# Patient Record
Sex: MALE | Race: WHITE | Employment: OTHER | URBAN - METROPOLITAN AREA
[De-identification: names, ages, dates, MRNs, and addresses within clinical notes are randomized per-mention and may not be internally consistent; named-entity substitution may affect disease eponyms.]

---

## 2019-12-24 PROBLEM — I10 ESSENTIAL HYPERTENSION: Status: ACTIVE | Noted: 2019-12-24

## 2019-12-24 PROBLEM — E78.00 PURE HYPERCHOLESTEROLEMIA: Status: ACTIVE | Noted: 2019-12-24

## 2019-12-24 PROBLEM — I25.10 CORONARY ARTERY DISEASE INVOLVING NATIVE CORONARY ARTERY OF NATIVE HEART WITHOUT ANGINA PECTORIS: Status: ACTIVE | Noted: 2019-12-24

## 2019-12-24 PROBLEM — R00.1 BRADYCARDIA: Status: ACTIVE | Noted: 2019-12-24

## 2020-06-14 RX ORDER — CEFAZOLIN SODIUM/WATER 2 G/20 ML
2 SYRINGE (ML) INTRAVENOUS ONCE
Status: CANCELLED | OUTPATIENT
Start: 2020-06-14 | End: 2020-06-14

## 2020-06-16 ENCOUNTER — HOSPITAL ENCOUNTER (OUTPATIENT)
Dept: SURGERY | Age: 70
Discharge: HOME | End: 2020-06-16
Payer: MEDICARE

## 2020-06-16 ENCOUNTER — HOSPITAL ENCOUNTER (OUTPATIENT)
Dept: PHYSICAL THERAPY | Age: 70
Discharge: HOME | End: 2020-06-16
Payer: MEDICARE

## 2020-06-16 ENCOUNTER — HOME HEALTH ADMISSION (OUTPATIENT)
Dept: HOME HEALTH SERVICES | Facility: HOME HEALTH | Age: 70
End: 2020-06-16
Payer: MEDICARE

## 2020-06-16 VITALS
DIASTOLIC BLOOD PRESSURE: 91 MMHG | TEMPERATURE: 97.7 F | BODY MASS INDEX: 28.04 KG/M2 | SYSTOLIC BLOOD PRESSURE: 146 MMHG | OXYGEN SATURATION: 97 % | HEART RATE: 53 BPM | WEIGHT: 207 LBS | HEIGHT: 72 IN

## 2020-06-16 LAB
ANION GAP SERPL CALC-SCNC: 3 MMOL/L (ref 7–16)
APTT PPP: 29.5 SEC (ref 24.3–35.4)
BACTERIA SPEC CULT: NORMAL
BASOPHILS # BLD: 0 K/UL (ref 0–0.2)
BASOPHILS NFR BLD: 1 % (ref 0–2)
BUN SERPL-MCNC: 15 MG/DL (ref 8–23)
CALCIUM SERPL-MCNC: 9.3 MG/DL (ref 8.3–10.4)
CHLORIDE SERPL-SCNC: 106 MMOL/L (ref 98–107)
CO2 SERPL-SCNC: 28 MMOL/L (ref 21–32)
CREAT SERPL-MCNC: 0.94 MG/DL (ref 0.8–1.5)
DIFFERENTIAL METHOD BLD: ABNORMAL
EOSINOPHIL # BLD: 0.1 K/UL (ref 0–0.8)
EOSINOPHIL NFR BLD: 2 % (ref 0.5–7.8)
ERYTHROCYTE [DISTWIDTH] IN BLOOD BY AUTOMATED COUNT: 13.5 % (ref 11.9–14.6)
EST. AVERAGE GLUCOSE BLD GHB EST-MCNC: 105 MG/DL
GLUCOSE SERPL-MCNC: 104 MG/DL (ref 65–100)
HBA1C MFR BLD: 5.3 %
HCT VFR BLD AUTO: 48.8 % (ref 41.1–50.3)
HGB BLD-MCNC: 16.5 G/DL (ref 13.6–17.2)
IMM GRANULOCYTES # BLD AUTO: 0 K/UL (ref 0–0.5)
IMM GRANULOCYTES NFR BLD AUTO: 0 % (ref 0–5)
INR PPP: 1
LYMPHOCYTES # BLD: 1.4 K/UL (ref 0.5–4.6)
LYMPHOCYTES NFR BLD: 32 % (ref 13–44)
MCH RBC QN AUTO: 32.3 PG (ref 26.1–32.9)
MCHC RBC AUTO-ENTMCNC: 33.8 G/DL (ref 31.4–35)
MCV RBC AUTO: 95.5 FL (ref 79.6–97.8)
MONOCYTES # BLD: 0.5 K/UL (ref 0.1–1.3)
MONOCYTES NFR BLD: 12 % (ref 4–12)
NEUTS SEG # BLD: 2.4 K/UL (ref 1.7–8.2)
NEUTS SEG NFR BLD: 53 % (ref 43–78)
NRBC # BLD: 0 K/UL (ref 0–0.2)
PLATELET # BLD AUTO: 103 K/UL (ref 150–450)
PMV BLD AUTO: 12.4 FL (ref 9.4–12.3)
POTASSIUM SERPL-SCNC: 3.9 MMOL/L (ref 3.5–5.1)
PROTHROMBIN TIME: 13.1 SEC (ref 12–14.7)
RBC # BLD AUTO: 5.11 M/UL (ref 4.23–5.6)
SERVICE CMNT-IMP: NORMAL
SODIUM SERPL-SCNC: 137 MMOL/L (ref 136–145)
WBC # BLD AUTO: 4.5 K/UL (ref 4.3–11.1)

## 2020-06-16 PROCEDURE — 87641 MR-STAPH DNA AMP PROBE: CPT

## 2020-06-16 PROCEDURE — 85730 THROMBOPLASTIN TIME PARTIAL: CPT

## 2020-06-16 PROCEDURE — 77030027138 HC INCENT SPIROMETER -A

## 2020-06-16 PROCEDURE — 36415 COLL VENOUS BLD VENIPUNCTURE: CPT

## 2020-06-16 PROCEDURE — 97161 PT EVAL LOW COMPLEX 20 MIN: CPT

## 2020-06-16 PROCEDURE — 80048 BASIC METABOLIC PNL TOTAL CA: CPT

## 2020-06-16 PROCEDURE — 85025 COMPLETE CBC W/AUTO DIFF WBC: CPT

## 2020-06-16 PROCEDURE — 83036 HEMOGLOBIN GLYCOSYLATED A1C: CPT

## 2020-06-16 PROCEDURE — 85610 PROTHROMBIN TIME: CPT

## 2020-06-16 RX ORDER — LANOLIN ALCOHOL/MO/W.PET/CERES
3 CREAM (GRAM) TOPICAL
COMMUNITY

## 2020-06-16 RX ORDER — MELATONIN
5000 DAILY
COMMUNITY

## 2020-06-16 RX ORDER — MULTIVIT WITH MINERALS/HERBS
1 TABLET ORAL DAILY
COMMUNITY

## 2020-06-16 RX ORDER — SELENIUM 200 MCG
200 TABLET ORAL DAILY
COMMUNITY
End: 2020-07-08

## 2020-06-16 RX ORDER — ASPIRIN 81 MG/1
81 TABLET ORAL DAILY
COMMUNITY
End: 2020-07-08 | Stop reason: SDUPTHER

## 2020-06-16 NOTE — PROGRESS NOTES
Bret Acevedo  : (80 y.o.) Joint Isa Manzo at [address] [address].  [phone]       Physical Therapy Prehab Plan of Treatment and Evaluation Summary:2020    ICD-10: Treatment Diagnosis:   · Pain in Right Hip (M25.551)  · Stiffness of Right Hip, Not elsewhere classified (M25.651)  Precautions/Allergies:   Patient has no known allergies. MEDICAL/REFERRING DIAGNOSIS:  Unilateral primary osteoarthritis, right hip [M16.11]  REFERRING PHYSICIAN: Svetlana Elizabeth MD  DATE OF SURGERY: 20    Assessment:   Comments:  He is here with his spouse. [place] at [place] with her support. He currently has a R LE leg length discrepancy of just over an inch and has built up shoe. This is premorbid and he will need to wear these once ambulating for safety. He is motivated and prepared for surgery      PROBLEM LIST (Impacting functional limitations):  Mr. Margie Toth presents with the following right lower extremity(s) problems:  1. Strength  2. Range of Motion  3. Home Exercise Program  4. Pain   INTERVENTIONS PLANNED:  1. Home Exercise Program  2. Educational Discussion      TREATMENT PLAN: Effective Dates: 2020 TO 2020. Frequency/Duration: Patient to continue to perform home exercise program at least twice per day up until his surgery. GOALS: (Goals have been discussed and agreed upon with patient.)  Discharge Goals: Time Frame: 1 Day  1. Patient will demonstrate independence with a home exercise program designed to increase strength, range of motion and pain control to minimize functional deficits and optimize patient for total joint replacement. Rehabilitation Potential For Stated Goals: Good  Regarding Gilbert Mcpherson's therapy, I certify that the treatment plan above will be carried out by a therapist or under their direction.   Thank you for this referral,  Oxana Benítez, PT               HISTORY:   Present Symptoms:  Pain Intensity 1: 8(at its worst)  Pain Location 1: Hip, Groin  Pain Orientation 1: Anterior, Lateral, Medial, Posterior, Right   History of Present Injury/Illness (Reason for Referral):  Medical/Referring Diagnosis: Unilateral primary osteoarthritis, right hip [M16.11]   Past Medical History/Comorbidities:   Mr. Chalo Kaye  has a past medical history of CAD (coronary artery disease), Essential hypertension, Hyperlipidemia, and Thyroid disease. Mr. Chalo Kaye  has a past surgical history that includes hx heart catheterization and hx coronary stent placement. Social History/Living Environment:   Home Environment: Private residence  # Steps to Enter: 7  Hand Rails : Right  One/Two Story Residence: One story  Support Systems: Spouse/Significant Other/Partner  Patient Expects to be Discharged to[de-identified] Private residence  Current DME Used/Available at Home: None  Tub or Shower Type: Shower  Work/Activity:  retired  Dominant Side:  RIGHT  Current Medications:  See [address] note   Number of Personal Factors/Comorbidities that affect the Plan of Care: 1-2: MODERATE COMPLEXITY   EXAMINATION:   ADLs (Current Functional Status):   Ambulation:  [x] Independent  [] Walk Indoors Only  [] Walk Outdoors  [] Use Assistive Device  [] Use Wheelchair Only Dressing:  [x] Independent  Requires Assistance from Someone for:  [] Sock/Shoes  [] Pants  [] Everything   Bathing/Showering:   [x] Independent  [] Requires Assistance from Someone  [] [address] Only Household Activities:  [x] Routine house and yard work  [] Light Housework Only  [] None   Observation/Orthostatic Postural Assessment:    Leg length discrepancy, right(R LE 1/2\" shorter than L.  Has built up shoe)  ROM/Flexibility:   AROM: Within functional limits(L hip/knee 4; ankle 3+)                       RLE AROM  R Hip Flexion: 100  R Hip ABduction: 20   Strength:   Strength: Generally decreased, functional              RLE Strength  R Hip Flexion: 3+  R Hip ABduction: 3+  R Knee Extension: 4  R Ankle Dorsiflexion: 3+   Functional Mobility:    Sensation: Intact(L LE)    Stand to Sit: Independent  Sit to Stand: Independent  Stand Pivot Transfers: Independent  Distance (ft): 300 Feet (ft)  Ambulation - Level of Assistance: Independent  Assistive Device: (R built up shoe)  Speed/Teresa: Pace decreased (<100 feet/min)  Gait Abnormalities: Antalgic          Balance:    Sitting: Intact  Standing: Intact   Body Structures Involved:  1. Bones  2. Joints  3. Muscles Body Functions Affected:  1. Neuromusculoskeletal  2. Movement Related Activities and Participation Affected:  1. General Tasks and Demands  2. Mobility   Number of elements that affect the Plan of Care: 4+: HIGH COMPLEXITY   CLINICAL PRESENTATION:   Presentation: Stable and uncomplicated: LOW COMPLEXITY   CLINICAL DECISION MAKING:   Outcome Measure: Tool Used: Lower Extremity Functional Scale (LEFS)  Score:  Initial: 30/80 Most Recent: X/80 (Date: -- )   Interpretation of Score: 20 questions each scored on a 5 point scale with 0 representing \"extreme difficulty or unable to perform\" and 4 representing \"no difficulty\". The lower the score, the greater the functional disability. 80/80 represents no disability. Minimal detectable change is 9 points. Medical Necessity:   · Mr. Hanna Moore is expected to optimize his lower extremity strength and ROM in preparation for joint replacement surgery. Reason for Services/Other Comments:  · Achieve baseline assesment of musculoskeletal system, functional mobility and home environment. , educate in PT HEP in preparation for surgery, educate in hospital plan of care. Use of outcome tool(s) and clinical judgement create a POC that gives a: Clear prediction of patient's progress: LOW COMPLEXITY   TREATMENT:   Treatment/Session Assessment:  Patient was instructed in PT- HEP to increase strength and ROM in LEs. Answered all questions.   · Post session pain:  2  · Compliance with Program/Exercises: compliant most of the time.   Total Treatment Duration:  PT Patient Time In/Time Out  Time In: 1045  Time Out: [address] [facility]

## 2020-06-16 NOTE — PROGRESS NOTES
06/16/20 1030   Oxygen Therapy   O2 Sat (%) 97 %   Pulse via Oximetry 56 beats per minute   O2 Device Room air   Pre-Treatment   Breath Sounds Bilateral Clear;Diminished   Pre FEV1 (liters) 2.5 liters   % Predicted 69   Incentive Spirometry Treatment   Actual Volume (ml) 2000 ml     Initial respiratory Assessment completed with pt. Pt was interviewed and evaluated in Joint camp prior to surgery. Patient ID:  Homero Lopez  [id number]  57 y.o.  1950  Surgeon: Dr. Tavo Butler  Date of Surgery: 7/6/2020  Procedure: Total Right Hip Arthroplasty  Primary Care Physician: Kristy Zarate [phone]  Specialists:                      Pt instructed in the use of Incentive Spirometry. Pt instructed to bring Incentive Spirometer back on date of surgery & to start using Is upon return to pt room. Written instructions given to patient.   Pt taught proper cough technique    History of smoking:   DENIES                 Quit date:         Secondhand smoke:PARENTS    Past procedures with Oxygen desaturation or delayed awakening:DENIES    Past Medical History:   Diagnosis Date    Bradycardia     CAD (coronary artery disease)     2 stents placed in 2007 in [place] ; now followed by [address] [place] [address] [address] Cardiology     Essential hypertension     no meds     Hemochromatosis     not followed by Hematology     Hyperlipidemia     no meds     Ill-defined condition     has prosthetic left eye     Lower back pain     Melanoma ([facility] [address] 1986    left eye; eye was removed     OA (osteoarthritis)     Thyroid disease     Hypo=on meds    Vasovagal syncope 2014    while starting an IV for a colonoscopy         Respiratory history:DENIES SOB                                                                  Respiratory meds:  DENIES    FAMILY PRESENT:           WIFE                                                  PAST SLEEP STUDY:                    DENIES  HX OF CARLOS:                                         DENIES  CARLOS assessment: SLEEPS ON SIDE                                                   PHYSICAL EXAM   Body mass index is 28.07 kg/m².    Visit Vitals  BP (!) 146/91 (BP 1 Location: Left arm, BP Patient Position: At rest;Sitting)   Pulse (!) 53   Temp 97.7 °F (36.5 °C)   Ht 6' (1.829 m)   Wt 93.9 kg (207 lb)   SpO2 97%   BMI 28.07 kg/m²     Neck circumference:  41    cm    Loud snoring:                                         SOME  Witnessed apnea or wakening gasping or choking:        DENIES        Awakens with headaches:                                               DENIES  Morning or daytime tiredness/ sleepiness:                            TIRED  Dry mouth or sore throat in morning:            YES                                              Figueroa stage:  2-3                                   SACS score:18  Stop Bang   STOP-BANG  Does the patient snore loudly (louder than talking or loud enough to be heard through closed doors)?: No  Does the patient often feel tired, fatigued, or sleepy during the daytime, even after a \"good\" night's sleep?: Yes  Has anyone ever observed the patient stop breathing during their sleep? : No  Does the patient have or are they being treated for high blood pressure?: Yes  Is the patient's BMI greater than 35?: No  Is your neck circumference greater than 17 inches (Male) or 16 inches (Female)?: No  Is the patient older than 48?: Yes  Is the patient male?: Yes  CARLOS Score: 4  Has the patient been referred to Sleep Medicine?: No  Has the patient previously been diagnosed with Obstructive Sleep Apnea?: No                                        [facility]                             Referrals:    Pt. Phone Number:

## 2020-06-16 NOTE — PERIOP NOTES
PLEASE CONTINUE TAKING ALL PRESCRIPTION MEDICATIONS UP TO THE DAY OF SURGERY UNLESS OTHERWISE DIRECTED BELOW. DISCONTINUE all vitamins and supplements 21 days prior to surgery. DISCONTINUE Non-Steriodal Anti-Inflammatory (NSAIDS) such as Advil, Ibuprofen, Motrin, Naproxen, and Aleve five days prior to surgery. Home Medications to take  the day of surgery (07/06/2020)    81 mg Aspirin, Levothyroxine, Nature-thyroid           Home Medications   to Hold           Comments   *Bring: Gege-hex soap, Incentive Spirometer, Photo ID, Insurance Card, Nature-Thyroid   *Do not take Tylenol for 24 hours prior to surgery. *Visitor policy of 1 visitor per patient discussed. Please do not bring home medications with you on the day of surgery unless otherwise directed by your nurse. If you are instructed to bring home medications, please give them to your nurse as they will be administered by the nursing staff. If you have any questions, please call [facility] [phone]. A copy of this note was provided to the patient for reference.

## 2020-06-16 NOTE — PROGRESS NOTES
[facility] Camp Case Management note:  Patient screened in Prehab for discharge planning needs. Patient scheduled for a future total joint replacement. We discussed Home Health and equipment needed after surgery. List of Home Health providers offered. Patient w/o preference towards provider. Initial referral sent to [facility]. Will need walker and raised toilet seat.

## 2020-06-16 NOTE — PERIOP NOTES
Dr. Uriel Seth,    Your patient was seen in [address] [address] today. I am attaching the results of the labs for your to review and follow-up with if necessary. If you would like to order additional labs or tests please enter into [facility] or fax to [phone]. Please do not respond to this message as my mailbox is not monitored. You may call [phone] with questions or concerns. Recent Results (from the past 12 hour(s))   CBC WITH AUTOMATED DIFF    Collection Time: 06/16/20 10:32 AM   Result Value Ref Range    WBC 4.5 4.3 - 11.1 K/uL    RBC 5.11 4.23 - 5.6 M/uL    HGB 16.5 13.6 - 17.2 g/dL    HCT 48.8 41.1 - 50.3 %    MCV 95.5 79.6 - 97.8 FL    MCH 32.3 26.1 - 32.9 PG    MCHC 33.8 31.4 - 35.0 g/dL    RDW 13.5 11.9 - 14.6 %    PLATELET 193 (L) 403 - 450 K/uL    MPV 12.4 (H) 9.4 - 12.3 FL    ABSOLUTE NRBC 0.00 0.0 - 0.2 K/uL    DF AUTOMATED      NEUTROPHILS 53 43 - 78 %    LYMPHOCYTES 32 13 - 44 %    MONOCYTES 12 4.0 - 12.0 %    EOSINOPHILS 2 0.5 - 7.8 %    BASOPHILS 1 0.0 - 2.0 %    IMMATURE GRANULOCYTES 0 0.0 - 5.0 %    ABS. NEUTROPHILS 2.4 1.7 - 8.2 K/UL    ABS. LYMPHOCYTES 1.4 0.5 - 4.6 K/UL    ABS. MONOCYTES 0.5 0.1 - 1.3 K/UL    ABS. EOSINOPHILS 0.1 0.0 - 0.8 K/UL    ABS. BASOPHILS 0.0 0.0 - 0.2 K/UL    ABS. IMM.  GRANS. 0.0 0.0 - 0.5 K/UL   PROTHROMBIN TIME + INR    Collection Time: 06/16/20 10:32 AM   Result Value Ref Range    Prothrombin time 13.1 12.0 - 14.7 sec    INR 1.0     PTT    Collection Time: 06/16/20 10:32 AM   Result Value Ref Range    aPTT 29.5 24.3 - 12.9 SEC   METABOLIC PANEL, BASIC    Collection Time: 06/16/20 10:32 AM   Result Value Ref Range    Sodium 137 136 - 145 mmol/L    Potassium 3.9 3.5 - 5.1 mmol/L    Chloride 106 98 - 107 mmol/L    CO2 28 21 - 32 mmol/L    Anion gap 3 (L) 7 - 16 mmol/L    Glucose 104 (H) 65 - 100 mg/dL    BUN 15 8 - 23 MG/DL    Creatinine 0.94 0.8 - 1.5 MG/DL    GFR est AA >60 >60 ml/min/1.73m2    GFR est non-AA >60 >60 ml/min/1.73m2    Calcium 9.3 8.3 - 10.4 MG/DL   HEMOGLOBIN A1C WITH EAG    Collection Time: 06/16/20 10:32 AM   Result Value Ref Range    Hemoglobin A1c 5.3 %    Est. average glucose 105 mg/dL

## 2020-06-16 NOTE — PERIOP NOTES
Tate Pretty MD    Your patient recently had labs drawn during a hospital appointment due to an upcoming surgery. The results are attached. If you have any questions or concerns please reach out to your patient for a follow-up in your office. Please do not respond to this message as my mailbox is not monitored. You may call [phone] with questions or concerns. Recent Results (from the past 12 hour(s))   CBC WITH AUTOMATED DIFF    Collection Time: 06/16/20 10:32 AM   Result Value Ref Range    WBC 4.5 4.3 - 11.1 K/uL    RBC 5.11 4.23 - 5.6 M/uL    HGB 16.5 13.6 - 17.2 g/dL    HCT 48.8 41.1 - 50.3 %    MCV 95.5 79.6 - 97.8 FL    MCH 32.3 26.1 - 32.9 PG    MCHC 33.8 31.4 - 35.0 g/dL    RDW 13.5 11.9 - 14.6 %    PLATELET 717 (L) 286 - 450 K/uL    MPV 12.4 (H) 9.4 - 12.3 FL    ABSOLUTE NRBC 0.00 0.0 - 0.2 K/uL    DF AUTOMATED      NEUTROPHILS 53 43 - 78 %    LYMPHOCYTES 32 13 - 44 %    MONOCYTES 12 4.0 - 12.0 %    EOSINOPHILS 2 0.5 - 7.8 %    BASOPHILS 1 0.0 - 2.0 %    IMMATURE GRANULOCYTES 0 0.0 - 5.0 %    ABS. NEUTROPHILS 2.4 1.7 - 8.2 K/UL    ABS. LYMPHOCYTES 1.4 0.5 - 4.6 K/UL    ABS. MONOCYTES 0.5 0.1 - 1.3 K/UL    ABS. EOSINOPHILS 0.1 0.0 - 0.8 K/UL    ABS. BASOPHILS 0.0 0.0 - 0.2 K/UL    ABS. IMM.  GRANS. 0.0 0.0 - 0.5 K/UL   PROTHROMBIN TIME + INR    Collection Time: 06/16/20 10:32 AM   Result Value Ref Range    Prothrombin time 13.1 12.0 - 14.7 sec    INR 1.0     PTT    Collection Time: 06/16/20 10:32 AM   Result Value Ref Range    aPTT 29.5 24.3 - 01.9 SEC   METABOLIC PANEL, BASIC    Collection Time: 06/16/20 10:32 AM   Result Value Ref Range    Sodium 137 136 - 145 mmol/L    Potassium 3.9 3.5 - 5.1 mmol/L    Chloride 106 98 - 107 mmol/L    CO2 28 21 - 32 mmol/L    Anion gap 3 (L) 7 - 16 mmol/L    Glucose 104 (H) 65 - 100 mg/dL    BUN 15 8 - 23 MG/DL    Creatinine 0.94 0.8 - 1.5 MG/DL    GFR est AA >60 >60 ml/min/1.73m2    GFR est non-AA >60 >60 ml/min/1.73m2    Calcium 9.3 8.3 - 10.4 MG/DL   HEMOGLOBIN A1C WITH EAG    Collection Time: 06/16/20 10:32 AM   Result Value Ref Range    Hemoglobin A1c 5.3 %    Est. average glucose 105 mg/dL

## 2020-06-16 NOTE — PERIOP NOTES
Patient verified name and . Order for consent found in EHR and matches case posting; patient verified. Type 3 surgery, walk in joint camp assessment complete. Labs per surgeon: CBC, BMP, PT/PTT, HGB-A1C ; results within anesthesia protocols. Labs per anesthesia protocol: no additional labs needed. EKG: last done on 19; result within anesthesia protocols and available in EHR for reference if needed. Stress test (2020), ECHO (2020), Holter monitor (2020-2020), and [facility] cardiology office note (2020) available in EHR for reference if needed. Patient informed a Covid swab is required 7 days prior to surgery. The testing center is located at the [address], [place]. For questions or concerns the patient is advised to call [phone]. An appointment is required and the testing clinic is closed from 12-1 for lunch and on weekends. Appointment date/time 2020 @ 9:30 am found in EHR and provided to patient. MRSA/MSSA swab collected; pharmacy to review and dose antibiotic as appropriate. Hospital approved surgical skin cleanser and instructions to return bottle on DOS given per hospital policy. Patient provided with handouts including Guide to Surgery, Pain Management, Hand Hygiene, Blood Transfusion Education, and [facility] Brochure. Patient answered medical/surgical history questions at their best of ability. All prior to admission medications documented in [facility]. Original medication prescription bottles NOT visualized during patient appointment. Patient instructed to hold all vitamins 3 weeks prior to surgery and NSAIDS 5 days prior to surgery. Patient teach back successful and patient demonstrates knowledge of instruction.

## 2020-06-17 NOTE — ADVANCED PRACTICE NURSE
Total Joint Surgery Preoperative Chart Review      Patient ID:  Joseph Lund  [id number]  56 y.o.  1950  Surgeon: Dr. Foreign Galindo  Date of Surgery: 7/6/2020  Procedure: Total Right Knee Arthroplasty  Primary Care Physician: Elsi Alex [phone]  Specialty Physician(s):      Subjective:   Joseph Lund is a 68 y.o. WHITE OR  male who presents for preoperative evaluation for Total Right Knee arthroplasty. This is a preoperative chart review note based on data collected by the nurse at the surgical Pre-Assessment visit. Past Medical History:   Diagnosis Date    Bradycardia     CAD (coronary artery disease)     2 stents placed in 2007 in [place] ; now followed by [facility]iology     Essential hypertension     no meds     Hemochromatosis     not followed by Hematology     Hyperlipidemia     no meds     Ill-defined condition     has prosthetic left eye     Lower back pain     Melanoma ([address] 1986    left eye; eye was removed     OA (osteoarthritis)     Thyroid disease     Hypo=on meds    Vasovagal syncope 2014    while starting an IV for a colonoscopy       Past Surgical History:   Procedure Laterality Date    HX CORONARY STENT PLACEMENT  2007    2 stents     HX HEENT Left 1986    left eye removed due to melanoma ; has prosthetic left eye     HX ORTHOPAEDIC Right 1975    ligament repair right ankle     HX SHOULDER ARTHROSCOPY Right 1984     Family History   Problem Relation Age of Onset    Coronary Artery Disease Father     Cancer Father     Deep Vein Thrombosis Father     Emphysema Mother       Social History     Tobacco Use    Smoking status: Never Smoker    Smokeless tobacco: Never Used   Substance Use Topics    Alcohol use: Never     Frequency: Never       Prior to Admission medications    Medication Sig Start Date End Date Taking? Authorizing Provider   aspirin delayed-release 81 mg tablet Take 81 mg by mouth daily.  Take / use AM day of surgery  per anesthesia protocols. Yes Provider, Historical   cholecalciferol (Vitamin D3) (1000 Units /25 mcg) tablet Take 5,000 Units by mouth daily. Yes Provider, Historical   zinc 50 mg tab tablet Take 50 mg by mouth daily. Yes Provider, Historical   selenium 200 mcg tab Take 200 mcg by mouth daily. Yes Provider, Historical   saw palmetto xtr/zinc picolin (SAW PALMETTO EXTRACT PO) Take 1,100 mg by mouth daily. Yes Provider, Historical   b complex vitamins tablet Take 1 Tab by mouth daily. Yes Provider, Historical   OTHER,NON-FORMULARY, Take  by mouth daily. Tart Juice Extract 2 tablespoons daily   Yes Provider, Historical   melatonin 3 mg tablet Take 3 mg by mouth nightly. Yes Provider, Historical   levothyroxine (SYNTHROID) 25 mcg tablet Take 25 mcg by mouth Daily (before breakfast). 12/11/19  Yes Provider, Historical   NATURE-THROID 48.75 mg tab TAKE 1 TABLET BY MOUTH ONCE DAILY IN THE MORNING 11/8/19  Yes Provider, Historical   OTHER 0.5 mL by IntraMUSCular route Every Friday. Testosterone injection weekly    Yes Provider, Historical   anastrozole (ARIMIDEX) 1 mg tablet Take 0.5 mg by mouth Every Friday. Yes Provider, Historical   multivitamin (ONE A DAY) tablet Take 1 Tab by mouth Three (3) times a week. Yes Provider, Historical   magnesium oxide (MAG-OX) 400 mg tablet Take 400 mg by mouth daily. Yes Provider, Historical   OTHER Take 1,000 mg by mouth daily. Folate   Yes Provider, Historical   cyanocobalamin (VITAMIN B-12) 1,000 mcg/mL injection 1,000 mcg by IntraMUSCular route Every Saturday. Yes Provider, Historical     No Known Allergies       Objective:     Physical Exam:   No data found.     ECG:    EKG Results     None          Data Review:   Labs:   Labs reviewed        Problem List:  )  Patient Active Problem List   Diagnosis Code    Coronary artery disease involving native coronary artery of native heart without angina pectoris I25.10    Essential hypertension I10    Pure hypercholesterolemia E78.00  Bradycardia R00.1       Total Joint Surgery Pre-Assessment Recommendations:           Recommend continuous saturation monitoring hours of sleep, during hospitalization.       Signed By: IAIN Casey    June 17, 2020

## 2020-06-27 NOTE — H&P
[address] [facility]  Pre Operative History and Physical Exam    Patient ID:  Andry Balderas  [id number]  56 y.o.  1950    Today: June 26, 2020           CC:  Right hip pain    HPI:   The patient has end stage arthritis of the right hip. The patient was evaluated and examined during a consultation prior to this office visit. There have been no changes to the patient's orthopedic condition since the initial consultation. The patient has failed previous conservative treatment for this condition including antiinflammatories , and lifestyle modifications. The necessity for joint replacement is present.  The patient will be admitted the day of surgery for Procedure(s) (LRB):  RIGHT HIP ARTHROPLASTY TOTAL / Murl Ping (Right)      Past Medical/Surgical History:  Past Medical History:   Diagnosis Date    Bradycardia     CAD (coronary artery disease)     2 stents placed in 2007 in [place] ; now followed by [facility] Cardiology     Essential hypertension     no meds     Hemochromatosis     not followed by Hematology     Hyperlipidemia     no meds     Ill-defined condition     has prosthetic left eye     Lower back pain     Melanoma ([facility] [address] 1986    left eye; eye was removed     OA (osteoarthritis)     Thyroid disease     Hypo=on meds    Vasovagal syncope 2014    while starting an IV for a colonoscopy      Past Surgical History:   Procedure Laterality Date    HX CORONARY STENT PLACEMENT  2007    2 stents     HX HEENT Left 1986    left eye removed due to melanoma ; has prosthetic left eye     HX ORTHOPAEDIC Right 1975    ligament repair right ankle     HX SHOULDER ARTHROSCOPY Right 1984        Allergies: No Known Allergies     Physical Exam:   General: NAD, Alert, Oriented, Appears their stated age     [de-identified]: NC/AT, PERRL    Skin: No rashes, lesions or wounds seen      Psych: normal affect      Heart: Regular Rate, Rhythm     Lungs: unlabored respirations, normal breath sounds     Abdomen: Soft and non-distended     Ortho: Pain with limited ROM of the right hip    Neuro: no focal defects, sensation is equal bilaterally     Lymph: no lymphadenopathy     Meds:   No current facility-administered medications for this encounter. Current Outpatient Medications   Medication Sig    aspirin delayed-release 81 mg tablet Take 81 mg by mouth daily. Take / use AM day of surgery  per anesthesia protocols.  cholecalciferol (Vitamin D3) (1000 Units /25 mcg) tablet Take 5,000 Units by mouth daily.  zinc 50 mg tab tablet Take 50 mg by mouth daily.  selenium 200 mcg tab Take 200 mcg by mouth daily.  saw palmetto xtr/zinc picolin (SAW PALMETTO EXTRACT PO) Take 1,100 mg by mouth daily.  b complex vitamins tablet Take 1 Tab by mouth daily.  OTHER,NON-FORMULARY, Take  by mouth daily. Tart Juice Extract 2 tablespoons daily    melatonin 3 mg tablet Take 3 mg by mouth nightly.  levothyroxine (SYNTHROID) 25 mcg tablet Take 25 mcg by mouth Daily (before breakfast).  NATURE-THROID 48.75 mg tab TAKE 1 TABLET BY MOUTH ONCE DAILY IN THE MORNING    OTHER 0.5 mL by IntraMUSCular route Every Friday. Testosterone injection weekly     anastrozole (ARIMIDEX) 1 mg tablet Take 0.5 mg by mouth Every Friday.  multivitamin (ONE A DAY) tablet Take 1 Tab by mouth Three (3) times a week.  magnesium oxide (MAG-OX) 400 mg tablet Take 400 mg by mouth daily.  OTHER Take 1,000 mg by mouth daily. Folate    cyanocobalamin (VITAMIN B-12) 1,000 mcg/mL injection 1,000 mcg by IntraMUSCular route Every Saturday.          Labs:  Hospital Outpatient Visit on 06/16/2020   Component Date Value Ref Range Status    WBC 06/16/2020 4.5  4.3 - 11.1 K/uL Final    RBC 06/16/2020 5.11  4.23 - 5.6 M/uL Final    HGB 06/16/2020 16.5  13.6 - 17.2 g/dL Final    HCT 06/16/2020 48.8  41.1 - 50.3 % Final    MCV 06/16/2020 95.5  79.6 - 97.8 FL Final    MCH 06/16/2020 32.3  26.1 - 32.9 PG Final    MCHC 06/16/2020 33.8  31.4 - 35.0 g/dL Final    RDW 06/16/2020 13.5  11.9 - 14.6 % Final    PLATELET 79/03/8744 964* 150 - 450 K/uL Final    MPV 06/16/2020 12.4* 9.4 - 12.3 FL Final    ABSOLUTE NRBC 06/16/2020 0.00  0.0 - 0.2 K/uL Final    **Note: Absolute NRBC parameter is now reported with Hemogram**    DF 06/16/2020 AUTOMATED    Final    NEUTROPHILS 06/16/2020 53  43 - 78 % Final    LYMPHOCYTES 06/16/2020 32  13 - 44 % Final    MONOCYTES 06/16/2020 12  4.0 - 12.0 % Final    EOSINOPHILS 06/16/2020 2  0.5 - 7.8 % Final    BASOPHILS 06/16/2020 1  0.0 - 2.0 % Final    IMMATURE GRANULOCYTES 06/16/2020 0  0.0 - 5.0 % Final    ABS. NEUTROPHILS 06/16/2020 2.4  1.7 - 8.2 K/UL Final    ABS. LYMPHOCYTES 06/16/2020 1.4  0.5 - 4.6 K/UL Final    ABS. MONOCYTES 06/16/2020 0.5  0.1 - 1.3 K/UL Final    ABS. EOSINOPHILS 06/16/2020 0.1  0.0 - 0.8 K/UL Final    ABS. BASOPHILS 06/16/2020 0.0  0.0 - 0.2 K/UL Final    ABS. IMM. GRANS. 06/16/2020 0.0  0.0 - 0.5 K/UL Final    Prothrombin time 06/16/2020 13.1  12.0 - 14.7 sec Final    INR 06/16/2020 1.0    Final    Comment: Suggested therapeutic INR range:  Venous thrombosis and embolus  2.0-3.0  Prosthetic heart valve         2.5-3.5  ** Note new reference range and method **      aPTT 06/16/2020 29.5  24.3 - 35.4 SEC Final    Comment: Heparin Therapeutic Range = 74 - 123 seconds  In addition to factor deficiency, monitoring heparin therapy, etc., evaluation of a prolonged aPTT result should include consideration of preanalytic variables such as heparin flush contamination, specimen integrity issues, etc.      Sodium 06/16/2020 137  136 - 145 mmol/L Final    Potassium 06/16/2020 3.9  3.5 - 5.1 mmol/L Final    Chloride 06/16/2020 106  98 - 107 mmol/L Final    CO2 06/16/2020 28  21 - 32 mmol/L Final    Anion gap 06/16/2020 3* 7 - 16 mmol/L Final    Glucose 06/16/2020 104* 65 - 100 mg/dL Final    Comment: 47 - 60 mg/dl Consistent with, but not fully diagnostic of hypoglycemia.   101 - 125 mg/dl Impaired fasting glucose/consistent with pre-diabetes mellitus  > 126 mg/dl Fasting glucose consistent with overt diabetes mellitus      BUN 06/16/2020 15  8 - 23 MG/DL Final    Creatinine 06/16/2020 0.94  0.8 - 1.5 MG/DL Final    GFR est AA 06/16/2020 >60  >60 ml/min/1.73m2 Final    GFR est non-AA 06/16/2020 >60  >60 ml/min/1.73m2 Final    Comment: (NOTE)  Estimated GFR is calculated using the Modification of Diet in Renal   Disease (MDRD) Study equation, reported for both  Americans   (GFRAA) and non- Americans (GFRNA), and normalized to 1.73m2   body surface area. The physician must decide which value applies to   the patient. The MDRD study equation should only be used in   individuals age 25 or older. It has not been validated for the   following: pregnant women, patients with serious comorbid conditions,   or on certain medications, or persons with extremes of body size,   muscle mass, or nutritional status.  Calcium 06/16/2020 9.3  8.3 - 10.4 MG/DL Final    Hemoglobin A1c 06/16/2020 5.3  % Final    Est. average glucose 06/16/2020 105  mg/dL Final    Comment: (NOTE)  The eAG should be interpreted with patient characteristics in mind   since ethnicity, interindividual differences, red cell lifespan,   variation in rates of glycation, etc. may affect the validity of the   calculation.  Special Requests: 06/16/2020 NASAL O2    Final    Culture result: 06/16/2020 SA target not detected. A MRSA NEGATIVE, SA NEGATIVE test result does not preclude MRSA or SA nasal colonization. Final                 Patient Active Problem List   Diagnosis Code    Coronary artery disease involving native coronary artery of native heart without angina pectoris I25.10    Essential hypertension I10    Pure hypercholesterolemia E78.00    Bradycardia R00.1         Assessment:   1. Arthritis of the right hip      Plan:    1.  Proceed with scheduled Procedure(s) (LRB):  RIGHT HIP ARTHROPLASTY TOTAL / Olga Lidia Fiddler (Right)      The patient was counseled at length about the risks of serge Covid-19 during their perioperative period and any recovery window from their procedure. The patient was made aware that serge Covid-19  may worsen their prognosis for recovering from their procedure and lend to a higher morbidity and/or mortality risk. All material risks, benefits, and reasonable alternatives including postponing the procedure were discussed. The patient does  wish to proceed with the procedure at this time.          Signed By: LAKSHMI Houser  June 26, 2020

## 2020-07-05 ENCOUNTER — ANESTHESIA EVENT (OUTPATIENT)
Dept: SURGERY | Age: 70
End: 2020-07-05
Payer: MEDICARE

## 2020-07-06 ENCOUNTER — HOSPITAL ENCOUNTER (OUTPATIENT)
Age: 70
Discharge: HOME | End: 2020-07-07
Attending: ORTHOPAEDIC SURGERY | Admitting: ORTHOPAEDIC SURGERY
Payer: MEDICARE

## 2020-07-06 ENCOUNTER — APPOINTMENT (OUTPATIENT)
Dept: GENERAL RADIOLOGY | Age: 70
End: 2020-07-06
Attending: PHYSICIAN ASSISTANT
Payer: MEDICARE

## 2020-07-06 ENCOUNTER — ANESTHESIA (OUTPATIENT)
Dept: SURGERY | Age: 70
End: 2020-07-06
Payer: MEDICARE

## 2020-07-06 DIAGNOSIS — Z96.641 STATUS POST RIGHT HIP REPLACEMENT: Primary | ICD-10-CM

## 2020-07-06 PROBLEM — M16.11 PRIMARY OSTEOARTHRITIS OF RIGHT HIP: Status: ACTIVE | Noted: 2020-07-06

## 2020-07-06 PROBLEM — M16.11 OSTEOARTHRITIS OF RIGHT HIP: Status: ACTIVE | Noted: 2020-07-06

## 2020-07-06 LAB
GLUCOSE BLD STRIP.AUTO-MCNC: 128 MG/DL (ref 65–100)
HGB BLD-MCNC: 14.6 G/DL (ref 13.6–17.2)

## 2020-07-06 PROCEDURE — 77030006835 HC BLD SAW SAG STRY -B: Performed by: ORTHOPAEDIC SURGERY

## 2020-07-06 PROCEDURE — 65270000029 HC RM PRIVATE

## 2020-07-06 PROCEDURE — 85018 HEMOGLOBIN: CPT

## 2020-07-06 PROCEDURE — 97161 PT EVAL LOW COMPLEX 20 MIN: CPT

## 2020-07-06 PROCEDURE — 74011250636 HC RX REV CODE- 250/636: Performed by: ORTHOPAEDIC SURGERY

## 2020-07-06 PROCEDURE — 82962 GLUCOSE BLOOD TEST: CPT

## 2020-07-06 PROCEDURE — 77030040922 HC BLNKT HYPOTHRM STRY -A: Performed by: ANESTHESIOLOGY

## 2020-07-06 PROCEDURE — 76210000006 HC OR PH I REC 0.5 TO 1 HR: Performed by: ORTHOPAEDIC SURGERY

## 2020-07-06 PROCEDURE — 76010000171 HC OR TIME 2 TO 2.5 HR INTENSV-TIER 1: Performed by: ORTHOPAEDIC SURGERY

## 2020-07-06 PROCEDURE — 74011250637 HC RX REV CODE- 250/637: Performed by: PHYSICIAN ASSISTANT

## 2020-07-06 PROCEDURE — 77030008459 HC STPLR SKN COOP -B: Performed by: ORTHOPAEDIC SURGERY

## 2020-07-06 PROCEDURE — 74011000250 HC RX REV CODE- 250: Performed by: NURSE ANESTHETIST, CERTIFIED REGISTERED

## 2020-07-06 PROCEDURE — 77030007880 HC KT SPN EPDRL BBMI -B: Performed by: ANESTHESIOLOGY

## 2020-07-06 PROCEDURE — 77030008462 HC STPLR SKN PROX J&J -A: Performed by: ORTHOPAEDIC SURGERY

## 2020-07-06 PROCEDURE — 74011250637 HC RX REV CODE- 250/637: Performed by: ANESTHESIOLOGY

## 2020-07-06 PROCEDURE — C1776 JOINT DEVICE (IMPLANTABLE): HCPCS | Performed by: ORTHOPAEDIC SURGERY

## 2020-07-06 PROCEDURE — 74011250636 HC RX REV CODE- 250/636: Performed by: NURSE ANESTHETIST, CERTIFIED REGISTERED

## 2020-07-06 PROCEDURE — 94760 N-INVAS EAR/PLS OXIMETRY 1: CPT

## 2020-07-06 PROCEDURE — 74011000250 HC RX REV CODE- 250: Performed by: ORTHOPAEDIC SURGERY

## 2020-07-06 PROCEDURE — 97535 SELF CARE MNGMENT TRAINING: CPT

## 2020-07-06 PROCEDURE — 36415 COLL VENOUS BLD VENIPUNCTURE: CPT

## 2020-07-06 PROCEDURE — 94762 N-INVAS EAR/PLS OXIMTRY CONT: CPT

## 2020-07-06 PROCEDURE — 74011250636 HC RX REV CODE- 250/636: Performed by: ANESTHESIOLOGY

## 2020-07-06 PROCEDURE — 74011250636 HC RX REV CODE- 250/636: Performed by: PHYSICIAN ASSISTANT

## 2020-07-06 PROCEDURE — 77030018836 HC SOL IRR NACL ICUM -A: Performed by: ORTHOPAEDIC SURGERY

## 2020-07-06 PROCEDURE — 74011000258 HC RX REV CODE- 258: Performed by: ORTHOPAEDIC SURGERY

## 2020-07-06 PROCEDURE — 77030031139 HC SUT VCRL2 J&J -A: Performed by: ORTHOPAEDIC SURGERY

## 2020-07-06 PROCEDURE — 77030012935 HC DRSG AQUACEL BMS -B: Performed by: ORTHOPAEDIC SURGERY

## 2020-07-06 PROCEDURE — 77030019557 HC ELECTRD VES SEAL MEDT -F: Performed by: ORTHOPAEDIC SURGERY

## 2020-07-06 PROCEDURE — 77030040361 HC SLV COMPR DVT MDII -B

## 2020-07-06 PROCEDURE — 97110 THERAPEUTIC EXERCISES: CPT

## 2020-07-06 PROCEDURE — 77030013708 HC HNDPC SUC IRR PULS STRY –B: Performed by: ORTHOPAEDIC SURGERY

## 2020-07-06 PROCEDURE — 72170 X-RAY EXAM OF PELVIS: CPT

## 2020-07-06 PROCEDURE — 77030003665 HC NDL SPN BBMI -A: Performed by: ANESTHESIOLOGY

## 2020-07-06 PROCEDURE — 97165 OT EVAL LOW COMPLEX 30 MIN: CPT

## 2020-07-06 PROCEDURE — 74011000250 HC RX REV CODE- 250: Performed by: ANESTHESIOLOGY

## 2020-07-06 PROCEDURE — 74011000250 HC RX REV CODE- 250: Performed by: PHYSICIAN ASSISTANT

## 2020-07-06 PROCEDURE — 76060000035 HC ANESTHESIA 2 TO 2.5 HR: Performed by: ORTHOPAEDIC SURGERY

## 2020-07-06 DEVICE — STEM FEM SZ 16 L170MM NK L38.5MM 43.5MM OFFSET 135DEG STD: Type: IMPLANTABLE DEVICE | Site: HIP | Status: FUNCTIONAL

## 2020-07-06 DEVICE — HEAD FEM DIA36MM +8MM OFFSET 12/14 TAPR HIP CERAMIC BIOLOX: Type: IMPLANTABLE DEVICE | Site: HIP | Status: FUNCTIONAL

## 2020-07-06 DEVICE — IMPLANTABLE DEVICE: Type: IMPLANTABLE DEVICE | Site: HIP | Status: FUNCTIONAL

## 2020-07-06 DEVICE — SHELL ACET DIA62MM HIP BANTAM GRIPTION VIP TAPR DOME DSGN: Type: IMPLANTABLE DEVICE | Site: HIP | Status: FUNCTIONAL

## 2020-07-06 DEVICE — ELIMINATOR H APEX FOR 48-60MM PINN HIP SHELL: Type: IMPLANTABLE DEVICE | Site: HIP | Status: FUNCTIONAL

## 2020-07-06 DEVICE — HIP H2 TOT ADV OTHER HD IMPL CAPPED SYNTHES: Type: IMPLANTABLE DEVICE | Status: FUNCTIONAL

## 2020-07-06 RX ORDER — DIPHENHYDRAMINE HCL 25 MG
25 CAPSULE ORAL
Status: DISCONTINUED | OUTPATIENT
Start: 2020-07-06 | End: 2020-07-07 | Stop reason: HOSPADM

## 2020-07-06 RX ORDER — ROPIVACAINE HYDROCHLORIDE 2 MG/ML
INJECTION, SOLUTION EPIDURAL; INFILTRATION; PERINEURAL AS NEEDED
Status: DISCONTINUED | OUTPATIENT
Start: 2020-07-06 | End: 2020-07-06 | Stop reason: HOSPADM

## 2020-07-06 RX ORDER — LEVOTHYROXINE SODIUM 50 UG/1
25 TABLET ORAL
Status: DISCONTINUED | OUTPATIENT
Start: 2020-07-07 | End: 2020-07-07 | Stop reason: HOSPADM

## 2020-07-06 RX ORDER — HYDROMORPHONE HYDROCHLORIDE 2 MG/1
2 TABLET ORAL
Status: DISCONTINUED | OUTPATIENT
Start: 2020-07-06 | End: 2020-07-07 | Stop reason: HOSPADM

## 2020-07-06 RX ORDER — LANOLIN ALCOHOL/MO/W.PET/CERES
400 CREAM (GRAM) TOPICAL DAILY
Status: DISCONTINUED | OUTPATIENT
Start: 2020-07-06 | End: 2020-07-07 | Stop reason: HOSPADM

## 2020-07-06 RX ORDER — DEXAMETHASONE SODIUM PHOSPHATE 100 MG/10ML
INJECTION INTRAMUSCULAR; INTRAVENOUS AS NEEDED
Status: DISCONTINUED | OUTPATIENT
Start: 2020-07-06 | End: 2020-07-06 | Stop reason: HOSPADM

## 2020-07-06 RX ORDER — HYDROMORPHONE HYDROCHLORIDE 1 MG/ML
1 INJECTION, SOLUTION INTRAMUSCULAR; INTRAVENOUS; SUBCUTANEOUS
Status: DISCONTINUED | OUTPATIENT
Start: 2020-07-06 | End: 2020-07-07 | Stop reason: HOSPADM

## 2020-07-06 RX ORDER — HYDROMORPHONE HYDROCHLORIDE 2 MG/ML
0.5 INJECTION, SOLUTION INTRAMUSCULAR; INTRAVENOUS; SUBCUTANEOUS
Status: DISCONTINUED | OUTPATIENT
Start: 2020-07-06 | End: 2020-07-06 | Stop reason: HOSPADM

## 2020-07-06 RX ORDER — CEFAZOLIN SODIUM/WATER 2 G/20 ML
2 SYRINGE (ML) INTRAVENOUS ONCE
Status: COMPLETED | OUTPATIENT
Start: 2020-07-06 | End: 2020-07-06

## 2020-07-06 RX ORDER — CEFAZOLIN SODIUM/WATER 2 G/20 ML
2 SYRINGE (ML) INTRAVENOUS EVERY 8 HOURS
Status: COMPLETED | OUTPATIENT
Start: 2020-07-06 | End: 2020-07-07

## 2020-07-06 RX ORDER — SODIUM CHLORIDE 0.9 % (FLUSH) 0.9 %
5-40 SYRINGE (ML) INJECTION AS NEEDED
Status: DISCONTINUED | OUTPATIENT
Start: 2020-07-06 | End: 2020-07-07 | Stop reason: HOSPADM

## 2020-07-06 RX ORDER — HYDROMORPHONE HYDROCHLORIDE 2 MG/1
2 TABLET ORAL
Qty: 30 TAB | Refills: 0 | Status: SHIPPED | OUTPATIENT
Start: 2020-07-06 | End: 2020-07-11

## 2020-07-06 RX ORDER — ACETAMINOPHEN 500 MG
1000 TABLET ORAL EVERY 6 HOURS
Status: DISCONTINUED | OUTPATIENT
Start: 2020-07-07 | End: 2020-07-07 | Stop reason: HOSPADM

## 2020-07-06 RX ORDER — ONDANSETRON 2 MG/ML
INJECTION INTRAMUSCULAR; INTRAVENOUS AS NEEDED
Status: DISCONTINUED | OUTPATIENT
Start: 2020-07-06 | End: 2020-07-06 | Stop reason: HOSPADM

## 2020-07-06 RX ORDER — AMOXICILLIN 250 MG
2 CAPSULE ORAL DAILY
Status: DISCONTINUED | OUTPATIENT
Start: 2020-07-07 | End: 2020-07-07 | Stop reason: HOSPADM

## 2020-07-06 RX ORDER — ASPIRIN 81 MG/1
81 TABLET ORAL EVERY 12 HOURS
Status: DISCONTINUED | OUTPATIENT
Start: 2020-07-06 | End: 2020-07-07 | Stop reason: HOSPADM

## 2020-07-06 RX ORDER — PROPOFOL 10 MG/ML
INJECTION, EMULSION INTRAVENOUS
Status: DISCONTINUED | OUTPATIENT
Start: 2020-07-06 | End: 2020-07-06 | Stop reason: HOSPADM

## 2020-07-06 RX ORDER — ACETAMINOPHEN 500 MG
1000 TABLET ORAL ONCE
Status: DISCONTINUED | OUTPATIENT
Start: 2020-07-06 | End: 2020-07-06 | Stop reason: HOSPADM

## 2020-07-06 RX ORDER — NALOXONE HYDROCHLORIDE 0.4 MG/ML
.2-.4 INJECTION, SOLUTION INTRAMUSCULAR; INTRAVENOUS; SUBCUTANEOUS
Status: DISCONTINUED | OUTPATIENT
Start: 2020-07-06 | End: 2020-07-07 | Stop reason: HOSPADM

## 2020-07-06 RX ORDER — SODIUM CHLORIDE 0.9 % (FLUSH) 0.9 %
5-40 SYRINGE (ML) INJECTION EVERY 8 HOURS
Status: CANCELLED | OUTPATIENT
Start: 2020-07-06

## 2020-07-06 RX ORDER — CELECOXIB 200 MG/1
200 CAPSULE ORAL ONCE
Status: COMPLETED | OUTPATIENT
Start: 2020-07-06 | End: 2020-07-06

## 2020-07-06 RX ORDER — OXYCODONE HYDROCHLORIDE 5 MG/1
10 TABLET ORAL
Status: DISCONTINUED | OUTPATIENT
Start: 2020-07-06 | End: 2020-07-06 | Stop reason: HOSPADM

## 2020-07-06 RX ORDER — SODIUM CHLORIDE 9 MG/ML
100 INJECTION, SOLUTION INTRAVENOUS CONTINUOUS
Status: DISCONTINUED | OUTPATIENT
Start: 2020-07-06 | End: 2020-07-07 | Stop reason: HOSPADM

## 2020-07-06 RX ORDER — KETOROLAC TROMETHAMINE 30 MG/ML
INJECTION, SOLUTION INTRAMUSCULAR; INTRAVENOUS AS NEEDED
Status: DISCONTINUED | OUTPATIENT
Start: 2020-07-06 | End: 2020-07-06 | Stop reason: HOSPADM

## 2020-07-06 RX ORDER — ACETAMINOPHEN 325 MG/1
975 TABLET ORAL ONCE
Status: CANCELLED | OUTPATIENT
Start: 2020-07-06 | End: 2020-07-06

## 2020-07-06 RX ORDER — CELECOXIB 200 MG/1
200 CAPSULE ORAL EVERY 12 HOURS
Status: DISCONTINUED | OUTPATIENT
Start: 2020-07-06 | End: 2020-07-07 | Stop reason: HOSPADM

## 2020-07-06 RX ORDER — TRANEXAMIC ACID 100 MG/ML
INJECTION, SOLUTION INTRAVENOUS AS NEEDED
Status: DISCONTINUED | OUTPATIENT
Start: 2020-07-06 | End: 2020-07-06 | Stop reason: HOSPADM

## 2020-07-06 RX ORDER — EPHEDRINE SULFATE/0.9% NACL/PF 50 MG/5 ML
SYRINGE (ML) INTRAVENOUS AS NEEDED
Status: DISCONTINUED | OUTPATIENT
Start: 2020-07-06 | End: 2020-07-06 | Stop reason: HOSPADM

## 2020-07-06 RX ORDER — FENTANYL CITRATE 50 UG/ML
INJECTION, SOLUTION INTRAMUSCULAR; INTRAVENOUS AS NEEDED
Status: DISCONTINUED | OUTPATIENT
Start: 2020-07-06 | End: 2020-07-06 | Stop reason: HOSPADM

## 2020-07-06 RX ORDER — CEFAZOLIN SODIUM/WATER 2 G/20 ML
2 SYRINGE (ML) INTRAVENOUS ONCE
Status: DISCONTINUED | OUTPATIENT
Start: 2020-07-06 | End: 2020-07-06 | Stop reason: SDUPTHER

## 2020-07-06 RX ORDER — SODIUM CHLORIDE, SODIUM LACTATE, POTASSIUM CHLORIDE, CALCIUM CHLORIDE 600; 310; 30; 20 MG/100ML; MG/100ML; MG/100ML; MG/100ML
100 INJECTION, SOLUTION INTRAVENOUS CONTINUOUS
Status: DISCONTINUED | OUTPATIENT
Start: 2020-07-06 | End: 2020-07-06 | Stop reason: HOSPADM

## 2020-07-06 RX ORDER — ONDANSETRON 4 MG/1
4 TABLET, ORALLY DISINTEGRATING ORAL
Status: DISCONTINUED | OUTPATIENT
Start: 2020-07-06 | End: 2020-07-07 | Stop reason: HOSPADM

## 2020-07-06 RX ORDER — LIDOCAINE HYDROCHLORIDE 10 MG/ML
0.3 INJECTION INFILTRATION; PERINEURAL ONCE
Status: DISCONTINUED | OUTPATIENT
Start: 2020-07-06 | End: 2020-07-06 | Stop reason: HOSPADM

## 2020-07-06 RX ORDER — ACETAMINOPHEN 650 MG/1
650 SUPPOSITORY RECTAL ONCE
Status: CANCELLED | OUTPATIENT
Start: 2020-07-06 | End: 2020-07-06

## 2020-07-06 RX ORDER — BUPIVACAINE HYDROCHLORIDE 7.5 MG/ML
INJECTION, SOLUTION INTRASPINAL
Status: COMPLETED | OUTPATIENT
Start: 2020-07-06 | End: 2020-07-06

## 2020-07-06 RX ORDER — DEXAMETHASONE SODIUM PHOSPHATE 100 MG/10ML
10 INJECTION INTRAMUSCULAR; INTRAVENOUS ONCE
Status: DISCONTINUED | OUTPATIENT
Start: 2020-07-07 | End: 2020-07-07 | Stop reason: HOSPADM

## 2020-07-06 RX ORDER — MIDAZOLAM HYDROCHLORIDE 1 MG/ML
2 INJECTION, SOLUTION INTRAMUSCULAR; INTRAVENOUS
Status: DISCONTINUED | OUTPATIENT
Start: 2020-07-06 | End: 2020-07-06 | Stop reason: HOSPADM

## 2020-07-06 RX ORDER — ASPIRIN 81 MG/1
81 TABLET ORAL EVERY 12 HOURS
Qty: 84 TAB | Refills: 0 | Status: SHIPPED | OUTPATIENT
Start: 2020-07-06 | End: 2020-08-17

## 2020-07-06 RX ORDER — LANOLIN ALCOHOL/MO/W.PET/CERES
3 CREAM (GRAM) TOPICAL
Status: DISCONTINUED | OUTPATIENT
Start: 2020-07-06 | End: 2020-07-07 | Stop reason: HOSPADM

## 2020-07-06 RX ADMIN — PROPOFOL 50 MCG/KG/MIN: 10 INJECTION, EMULSION INTRAVENOUS at 09:09

## 2020-07-06 RX ADMIN — MAGNESIUM GLUCONATE 500 MG ORAL TABLET 400 MG: 500 TABLET ORAL at 14:40

## 2020-07-06 RX ADMIN — Medication 10 MG: at 09:36

## 2020-07-06 RX ADMIN — CELECOXIB 200 MG: 200 CAPSULE ORAL at 20:52

## 2020-07-06 RX ADMIN — ASPIRIN 81 MG: 81 TABLET, COATED ORAL at 20:52

## 2020-07-06 RX ADMIN — CELECOXIB 200 MG: 200 CAPSULE ORAL at 07:09

## 2020-07-06 RX ADMIN — PROMETHAZINE HYDROCHLORIDE 12.5 MG: 25 INJECTION INTRAMUSCULAR; INTRAVENOUS at 21:41

## 2020-07-06 RX ADMIN — BUPIVACAINE HYDROCHLORIDE IN DEXTROSE 14 MG: 7.5 INJECTION, SOLUTION SUBARACHNOID at 09:05

## 2020-07-06 RX ADMIN — HYDROMORPHONE HYDROCHLORIDE 1 MG: 1 INJECTION, SOLUTION INTRAMUSCULAR; INTRAVENOUS; SUBCUTANEOUS at 18:01

## 2020-07-06 RX ADMIN — SODIUM CHLORIDE, SODIUM LACTATE, POTASSIUM CHLORIDE, AND CALCIUM CHLORIDE: 600; 310; 30; 20 INJECTION, SOLUTION INTRAVENOUS at 10:23

## 2020-07-06 RX ADMIN — Medication 2 G: at 09:26

## 2020-07-06 RX ADMIN — PHENYLEPHRINE HYDROCHLORIDE 50 MCG: 10 INJECTION INTRAVENOUS at 10:32

## 2020-07-06 RX ADMIN — ONDANSETRON 4 MG: 2 INJECTION INTRAMUSCULAR; INTRAVENOUS at 10:57

## 2020-07-06 RX ADMIN — Medication 10 MG: at 09:24

## 2020-07-06 RX ADMIN — SODIUM CHLORIDE, SODIUM LACTATE, POTASSIUM CHLORIDE, AND CALCIUM CHLORIDE 100 ML/HR: 600; 310; 30; 20 INJECTION, SOLUTION INTRAVENOUS at 07:09

## 2020-07-06 RX ADMIN — PHENYLEPHRINE HYDROCHLORIDE 100 MCG: 10 INJECTION INTRAVENOUS at 10:08

## 2020-07-06 RX ADMIN — DEXAMETHASONE SODIUM PHOSPHATE 5 MG: 10 INJECTION INTRAMUSCULAR; INTRAVENOUS at 10:57

## 2020-07-06 RX ADMIN — ONDANSETRON 4 MG: 4 TABLET, ORALLY DISINTEGRATING ORAL at 20:02

## 2020-07-06 RX ADMIN — CEFAZOLIN SODIUM 2 G: 100 INJECTION, POWDER, LYOPHILIZED, FOR SOLUTION INTRAVENOUS at 17:57

## 2020-07-06 RX ADMIN — Medication 1 AMPULE: at 20:53

## 2020-07-06 RX ADMIN — FENTANYL CITRATE 50 MCG: 50 INJECTION INTRAMUSCULAR; INTRAVENOUS at 09:02

## 2020-07-06 RX ADMIN — TRANEXAMIC ACID 1 G: 100 INJECTION, SOLUTION INTRAVENOUS at 08:57

## 2020-07-06 RX ADMIN — Medication 3 AMPULE: at 07:09

## 2020-07-06 RX ADMIN — FENTANYL CITRATE 50 MCG: 50 INJECTION INTRAMUSCULAR; INTRAVENOUS at 08:56

## 2020-07-06 RX ADMIN — HYDROMORPHONE HYDROCHLORIDE 2 MG: 2 TABLET ORAL at 14:40

## 2020-07-06 NOTE — PROGRESS NOTES
Care Management Interventions  PCP Verified by CM: Yes  Mode of Transport at Discharge: Self  Transition of Care Consult (CM Consult): [address]: Yes  Discharge Durable Medical Equipment: Yes  Physical Therapy Consult: Yes  Occupational Therapy Consult: Yes  Current Support Network: Lives with Spouse  Confirm Follow Up Transport: Family  The Plan for Transition of Care is Related to the Following Treatment Goals : return to independent function  The Patient and/or Patient Representative was Provided with a Choice of Provider and Agrees with the Discharge Plan?: Yes  Freedom of Choice List was Provided with Basic Dialogue that Supports the Patient's Individualized Plan of Care/Goals, Treatment Preferences and Shares the Quality Data Associated with the Providers?: Yes  Discharge Location  Discharge Placement: Home with home health    Patient is a 68y.o. year old male admitted for Right KANDI . Patient plans to return home on discharge. Order received to arrange home health. Patient without preference towards agency. Referral sent to [facility]. Patient  has a walker. Patient requesting we arrange a bedside commode. Referral sent to [address] [place] [address]. delivered to the hospital room prior to discharge. Will follow until discharge.

## 2020-07-06 NOTE — PROGRESS NOTES
Problem: Mobility Impaired (Adult and Pediatric)  Goal: *Acute Goals and Plan of Care (Insert Text)  Description: GOALS (1-4 days):  (1.)Mr. Damian Valdez will move from supine to sit and sit to supine  in bed with STAND BY ASSIST.    (2.)Mr. Damian Valdez will transfer from bed to chair and chair to bed with STAND BY ASSIST using the least restrictive device. (3.)Mr. Damian Valdez will ambulate with STAND BY ASSIST for 150 feet with the least restrictive device. (4.)Mr. Damian Valdez will ambulate up/down 5 steps with right railing with MINIMAL ASSIST with device as needed. (5.)Mr. Damian Valdez will state/observe KANDI precautions with 0 verbal cues. ________________________________________________________________________________________________     Outcome: Progressing Towards Goal     PHYSICAL THERAPY JOINT CAMP KANDI: Initial Assessment and PM 7/6/2020  INPATIENT: Hospital Day: 1  [place] / [place] / Product Type: Medicare /      NAME/AGE/GENDER: Ed Mohamud is a 68 y.o. male   PRIMARY DIAGNOSIS:  Primary osteoarthritis of right hip [M16.11]   Procedure(s) and Anesthesia Type:     * RIGHT HIP ARTHROPLASTY TOTAL / Ellan Heidy - Spinal (Right)  ICD-10: Treatment Diagnosis:    · Pain in Right Hip (M25.551)  · Stiffness of Right Hip, Not elsewhere classified (M25.651)  · Difficulty in walking, Not elsewhere classified (R26.2)      ASSESSMENT:     Mr. Damian Valdez presents with decreased functional mobility and gait as well as decreased rom and strength of right LE s/p right kandi. He plans to go home with HHPT. This section established at most recent assessment   PROBLEM LIST (Impairments causing functional limitations):  1. Decreased Strength  2. Decreased ADL/Functional Activities  3. Decreased Transfer Abilities  4. Decreased Ambulation Ability/Technique  5. Decreased Balance  6. Increased Pain  7. Decreased Activity Tolerance  8. Decreased Flexibility/Joint Mobility  9.  Decreased strength   INTERVENTIONS PLANNED: (Benefits and precautions of physical therapy have been discussed with the patient.)  1. bed mobility  2. gait training  3. home exercise program (HEP)  4. Range of Motion: active/assisted/passive  5. Therapeutic Activities  6. therapeutic exercise/strengthening  7. transfer training  8. Group Therapy     TREATMENT PLAN: Frequency/Duration: Follow patient BID for duration of hospital stay to address above goals. Rehabilitation Potential For Stated Goals: Good     RECOMMENDED REHABILITATION/EQUIPMENT: (at time of discharge pending progress): Continue Skilled Therapy and Home Health: Physical Therapy. HISTORY:   History of Present Injury/Illness (Reason for Referral):  S/p right joshua  Past Medical History/Comorbidities:   Mr. Osmar Madrigal  has a past medical history of Bradycardia, CAD (coronary artery disease), Essential hypertension, Hemochromatosis, Hyperlipidemia, Ill-defined condition, Lower back pain, Melanoma ([facility] [address] (1986), OA (osteoarthritis), Thyroid disease, and Vasovagal syncope (2014). He also has no past medical history of Adverse effect of anesthesia, Chronic pain, Difficult intubation, Malignant hyperthermia due to anesthesia, Nausea & vomiting, or Pseudocholinesterase deficiency. Mr. Osmar Madrigal  has a past surgical history that includes hx coronary stent placement (2007); hx orthopaedic (Right, 1975); hx shoulder arthroscopy (Right, 1984); and hx heent (Left, 1986).   Social History/Living Environment:   Home Environment: Private residence  # Steps to Enter: 7  Hand Rails : Right  One/Two Story Residence: One story  Living Alone: No  Support Systems: Spouse/Significant Other/Partner  Patient Expects to be Discharged to[de-identified] Private residence  Current DME Used/Available at Home: None  Tub or Shower Type: Shower  Prior Level of Function/Work/Activity:  independent   Number of Personal Factors/Comorbidities that affect the Plan of Care: 1-2: MODERATE COMPLEXITY   EXAMINATION:   Most Recent Physical Functioning:      Gross Assessment  AROM: Within functional limits(left LE)  Strength: Generally decreased, functional(left LE)        RLE AROM  R Hip Flexion: 80  R Hip ABduction: 10            Bed Mobility  Supine to Sit: Contact guard assistance  Scooting: Contact guard assistance    Transfers  Sit to Stand: Minimum assistance  Stand to Sit: Minimum assistance  Bed to Chair: Minimum assistance    Balance  Sitting: Intact  Standing: With support;Pull to stand              Weight Bearing Status  Right Side Weight Bearing: As tolerated  Distance (ft): 30 Feet (ft)  Ambulation - Level of Assistance: Minimal assistance  Assistive Device: Walker, rolling  Speed/Teresa: Delayed  Stance: Right decreased  Gait Abnormalities: Antalgic  Interventions: Safety awareness training;Verbal cues     Braces/Orthotics:     Right Hip Cold  Type: Cold/ice packs      Body Structures Involved:  1. Bones  2. Joints  3. Muscles  4. Ligaments Body Functions Affected:  1. Movement Related Activities and Participation Affected:  1. Mobility   Number of elements that affect the Plan of Care: 3: MODERATE COMPLEXITY   CLINICAL PRESENTATION:   Presentation: Stable and uncomplicated: LOW COMPLEXITY   CLINICAL DECISION MAKIN[address] [place] [address] [address] [address] AM-PAC 6 Clicks   Basic Mobility Inpatient Short Form  How much difficulty does the patient currently have. .. Unable A Lot A Little None   1. Turning over in bed (including adjusting bedclothes, sheets and blankets)? [] 1   [] 2   [x] 3   [] 4   2. Sitting down on and standing up from a chair with arms ( e.g., wheelchair, bedside commode, etc.)   [] 1   [] 2   [x] 3   [] 4   3. Moving from lying on back to sitting on the side of the bed? [] 1   [] 2   [x] 3   [] 4   How much help from another person does the patient currently need. .. Total A Lot A Little None   4. Moving to and from a bed to a chair (including a wheelchair)? [] 1   [] 2   [x] 3   [] 4   5. Need to walk in hospital room?    [] 1   [] 2   [x] 3   [] 4   6. Climbing 3-5 steps with a railing? [] 1   [] 2   [x] 3   [] 4   © 2007, Trustees of [address] [place] [address] [address] [address], under license to [employer]. All rights reserved     Score:  Initial: 18 Most Recent: X (Date: -- )    Interpretation of Tool:  Represents activities that are increasingly more difficult (i.e. Bed mobility, Transfers, Gait). Medical Necessity:     · Patient is expected to demonstrate progress in   · strength, range of motion, and balance  ·  to   · decrease assistance required with exercises and functional mobility   · . Reason for Services/Other Comments:  · Patient   · continues to require present interventions due to patient's inability to perform exercises and functional mobility independently   · . Use of outcome tool(s) and clinical judgement create a POC that gives a: Clear prediction of patient's progress: LOW COMPLEXITY            TREATMENT:   (In addition to Assessment/Re-Assessment sessions the following treatments were rendered)     Pre-treatment Symptoms/Complaints:  none  Pain Initial:      Post Session:  0     Therapeutic Exercise: (10 Minutes):  Exercises per grid below to improve mobility and strength. Required minimal visual, verbal, and manual cues to promote proper body alignment, promote proper body posture, and promote proper body mechanics. Progressed range and repetitions as indicated. Date:  7/6 Date:   Date:     ACTIVITY/EXERCISE AM PM AM PM AM PM   GROUP THERAPY  []  []  []  []  []  []   Ankle Pumps  10a       Quad Sets  10a       Gluteal Sets  10a       Hip ABd/ADduction  10a       Straight Leg Raises         Knee Slides  10a       Short Arc Quads         Long Arc Quads         Chair Slides                  B = bilateral; AA = active assistive; A = active; P = passive      Treatment/Session Assessment:     Response to Treatment:  did well.     Education:  [x] Home Exercises  [x] Fall Precautions  [x] Hip Precautions [] D/C Instruction Review  [x] Hip Prosthesis Review  [x] Walker Management/Safety [] Adaptive Equipment as Needed       Interdisciplinary Collaboration:   o Occupational Therapist  o Registered Nurse    After treatment position/precautions:   o Up in chair  o Bed/Chair-wheels locked  o Bed in low position  o Caregiver at bedside  o Call light within reach  o Family at bedside    Compliance with Program/Exercises: Will assess as treatment progresses. No questions. Recommendations/Intent for next treatment session:  Treatment next visit will focus on increasing Mr. Vicki Vaughan independence with bed mobility, transfers, gait training, strength/ROM exercises, modalities for pain, and patient education.       Total Treatment Duration:  PT Patient Time In/Time Out  Time In: 1325  Time Out: [address] [facility], PT

## 2020-07-06 NOTE — CONSULTS
67 yo hx HTN, HLD, CAD, and hypothyriodism admitted on 7/6/20 for right hip replacement. Agree with current treatment. No charge for this review. Will sign off.

## 2020-07-06 NOTE — PERIOP NOTES
Teach back method used in review of Hibiclens usage preop/postop, TB screening, pain management goals, falls precautions and use of Nozin for prevention of staph infections. Incentive spirometer reviewed and pt reached TOTAL TIDAL VOLUME 2500 ML OBSERVED   in preop holding.

## 2020-07-06 NOTE — PERIOP NOTES
TRANSFER - OUT REPORT:    Verbal report given to [facility] RN (name) on Brianne Srinivasan  being transferred to PRE-OP(unit) for routine progression of care       Report consisted of patients Situation, Background, Assessment and   Recommendations(SBAR). Information from the following report(s) SBAR, MAR and Recent Results was reviewed with the receiving nurse. Lines:   Peripheral IV 07/06/20 Left Wrist (Active)   Site Assessment Clean, dry, & intact 7/6/2020  7:07 AM   Phlebitis Assessment 0 7/6/2020  7:07 AM   Infiltration Assessment 0 7/6/2020  7:07 AM   Dressing Status Clean, dry, & intact 7/6/2020  7:07 AM   Dressing Type Tape;Transparent 7/6/2020  7:07 AM   Hub Color/Line Status Infusing;Green 7/6/2020  7:07 AM   Action Taken Blood drawn 7/6/2020  7:07 AM        Opportunity for questions and clarification was provided.       Patient transported with:   [employer]

## 2020-07-06 NOTE — H&P
The patient has end stage arthritis of the right hip. The patient was see and examined and there are no changes to the patient's orthopedic condition. They have tried conservative treatment for this condition; including antiinflammatories and lifestyle modifications and have failed. The necessity for the joint replacement is still present, and the H&P from the office is still current.  The patient will be admitted today for Procedure(s) (LRB):  RIGHT HIP ARTHROPLASTY TOTAL / Carlos A Cox (Right)

## 2020-07-06 NOTE — ANESTHESIA PREPROCEDURE EVALUATION
Relevant Problems   No relevant active problems       Anesthetic History   No history of anesthetic complications            Review of Systems / Medical History  Patient summary reviewed and pertinent labs reviewed    Pulmonary  Within defined limits                 Neuro/Psych              Cardiovascular    Hypertension          CAD    Exercise tolerance: >4 METS  Comments: ECHO with normal LV systolic and diastolic function, no significant valvular abnl    bradycardia   GI/Hepatic/Renal  Within defined limits              Endo/Other      Hypothyroidism  Arthritis     Other Findings              Physical Exam    Airway  Mallampati: I  TM Distance: 4 - 6 cm  Neck ROM: normal range of motion   Mouth opening: Normal     Cardiovascular    Rhythm: regular  Rate: abnormal         Dental    Dentition: Caps/crowns and Poor dentition     Pulmonary  Breath sounds clear to auscultation               Abdominal         Other Findings            Anesthetic Plan    ASA: 2  Anesthesia type: spinal            Anesthetic plan and risks discussed with: Patient

## 2020-07-06 NOTE — PROGRESS NOTES
Admission Assessment Complete. Pt had a RTHA today. Pt is A&Ox 3.  +2 pedal pulses with purposeful movement in all four extremities. Dressing is clean, dry and intact  Pt denies any pain or need at this time. Bed low and locked. Side rails x3. Call light with in reach. Pt verbalizes understanding of call light.

## 2020-07-06 NOTE — PROGRESS NOTES
07/06/20 1456   Oxygen Therapy   O2 Sat (%) 96 %   Pulse via Oximetry 74 beats per minute   O2 Device Room air   O2 Flow Rate (L/min) 0 l/min   Incentive Spirometry Treatment   Actual Volume (ml) 2250 ml   Patient encouraged to do 10 breaths every hour while awake-patient agreed and demonstrated. No shortness of breath or distress noted. BS are clear b/l. Joint Camp notes reviewed- Sat monitor ordered HS.

## 2020-07-06 NOTE — ANESTHESIA POSTPROCEDURE EVALUATION
Procedure(s):  RIGHT HIP ARTHROPLASTY TOTAL / Katlyn Montiel.     spinal    Anesthesia Post Evaluation      Multimodal analgesia: multimodal analgesia used between 6 hours prior to anesthesia start to PACU discharge  Patient location during evaluation: PACU  Patient participation: complete - patient participated  Level of consciousness: awake  Pain management: adequate  Airway patency: patent  Anesthetic complications: no  Cardiovascular status: acceptable  Respiratory status: acceptable  Hydration status: acceptable  Post anesthesia nausea and vomiting:  none      INITIAL Post-op Vital signs:   Vitals Value Taken Time   /82 7/6/2020 11:40 AM   Temp 36.7 °C (98 °F) 7/6/2020 11:15 AM   Pulse 58 7/6/2020 11:40 AM   Resp 16 7/6/2020 11:40 AM   SpO2 96 % 7/6/2020 11:40 AM

## 2020-07-06 NOTE — PROGRESS NOTES
TRANSFER - IN REPORT:    Verbal report received from Irina RN (name) on Andry Balderas  being received from PACU (unit) for routine progression of care      Report consisted of patients Situation, Background, Assessment and   Recommendations(SBAR). Information from the following report(s) SBAR, Kardex, OR Summary, Procedure Summary, Intake/Output, MAR, Accordion and Recent Results was reviewed with the receiving nurse. Opportunity for questions and clarification was provided. Assessment completed upon patients arrival to unit and care assumed.

## 2020-07-06 NOTE — OP NOTES
Total Hip Procedure Note    Jimmie Mckeon,  [id number],  1950    Pre-operative Diagnosis:  Primary osteoarthritis of right hip [M16.11]  Post-operative Diagnosis: same    Procedure: Procedure(s) (LRB):  RIGHT HIP ARTHROPLASTY TOTAL / Onetha Willis (Right)  CPT code: 300 [facility]  Location: [address] [place] [address]  Surgeon: Olamide Bowman MD  Assistant: HILLARY Husain, Link     Anesthesia: Spinal    Indications: Patient has end stage arthritis. They have tried and failed conservative management. Findings: severe degenerative arthritis, acetabular osteophytes and bone spurs around the femoral head. EBL: 300cc  BMI: Body mass index is 28.07 kg/m². Procedure: The complexity of total joint surgery requires the use of a first assistant for positioning, retraction and expertise in closure. Jimmie Mckeon was brought to the operating room and positioned on the operating table. Anesthesia was administered. IV antibiotics were administered. A time out identifying the patient, procedure, operative side and surgeon was administered and charted by the OR Nurse. The patient was positioned on the contralateral decubitus position. The limb was prepped and draped in the usual sterile fashion. A posterior approach was utilized to expose the hip. The incision was carried through the subcutaneous tissue and underlying fascia. Hemostasis obtained using the bovie cauterization. A Charnley retractor was inserted. The short external rotators were divided at their insertion. The sciatic nerve was palpated and identified. Next, a T-shaped capsular incision was accomplished and femoral head was dislocated. The neck was osteotomized a measured distance above the lesser trochanter. The neck and head cut was measured with a caliper. .    Acetabular retractors were placed and the appropriate capsulotomy performed. Soft tissue was removed from the acetabulum. The acetabulum was sequentially reamed.  Using trial components the acetabular component was sized. The acetabular component was impacted at approximately 40 degrees horizontal tilt and 20 degrees anteversion. No screws were used to fixate the cup. The real polyethylene liner was impacted into position. Utilizing the femoral retractor, the canal was prepared with appropriate lateralization and reamed with the starter reamer. The canal was then broached progressively to accommodate the DePuy stem. The broach was positioned with the anatomic femoral anteversion. A calcar planar was utilized. Trials were preformed using various neck length heads until leg length had been restored. The hip was carried through a range of motion and was found to be stable to flexion greater than 90 degrees and on internal and external rotation. Limb lengths were thought to be equilibrated and with appropriate stability as mentioned above. All trial components were removed. The cementless permanent stem was impacted into place. A trial reduction was performed and the above neck length was selected. The permanent ceramic femoral head was impacted into place. The hip was reduced and the stability was as mentioned as above. Copious irrigation was accomplished about the surgical site. The sciatic nerve was palpated and noted to be intact. The capsule was repaired with an absorbable suture and the external rotators were reattached with a #5 Mersilene. The operative hip was injected with 60 cc of Neuropin, 1cc of 10 mg of morphine, and 1 cc of 30 mg of Toradol. The Hip was then soaked with a diluted betadine solution for approximately 3 minutes and then thoroughly irrigated. A #1 PDS suture was used to re-approximate the fascia. Then, 1 gram (100 mg/ml) of Transexamic Acid was injected into the joint space. An insorb stapler with absorbable sutures were used to approximate the subcutaneous layers. Staples were applied in an occlusive fashion and a sterile bandage was placed.  The patient was then rolled to a supine position. The sponge, needle and instrument counts were correct. The patient tolerated the procedure without difficulty and left the operating room in satisfactory condition. Implants:   Implant Name Type Inv.  Item Serial No.  Lot No. LRB No. Used   ELIMINATOR APEX HOLE POSITIVE - [id number]  ELIMINATOR APEX HOLE POSITIVE [id number] [facility] [id number] Right 1   LINER ACET PINN LIP LNR [id number] -- ALTRX - [id number]  LINER ACET PINN LIP LNR [id number] -- ALTRX [id number] [facility] [id number] Right 1   CUP ACET PINN 100 W/ 62 --  - [id number]  CUP ACET PINN 100 W/ 62 --  [id number] [facility] [id number] Right 1   STEM FEM CORAIL STD COL SZ 16 --  - [id number]  STEM FEM CORAIL STD COL SZ 16 --  [id number] [facility] [id number] Right 1   HEAD FEM 36MM ARTICL/EZ +8MM - [id number]  HEAD FEM 36MM ARTICL/EZ +8MM [id number] [facility] [id number] Right 1           Signed By: Madison Bishop MD  7/6/2020,  10:33 AM

## 2020-07-06 NOTE — PERIOP NOTES
TRANSFER - OUT REPORT:    Verbal report given to Jeffry Shah RN (name) on Ed Mohamud  being transferred to [facility] (unit) for routine post - op       Report consisted of patients Situation, Background, Assessment and   Recommendations(SBAR). Information from the following report(s) OR Summary, Procedure Summary, Intake/Output and MAR was reviewed with the receiving nurse. Lines:   Peripheral IV 07/06/20 Left Wrist (Active)   Site Assessment Clean, dry, & intact 7/6/2020 11:15 AM   Phlebitis Assessment 0 7/6/2020 11:15 AM   Infiltration Assessment 0 7/6/2020 11:15 AM   Dressing Status Clean, dry, & intact 7/6/2020 11:15 AM   Dressing Type Tape;Transparent 7/6/2020  7:07 AM   Hub Color/Line Status Green; Infusing 7/6/2020 11:15 AM   Action Taken Blood drawn 7/6/2020  7:07 AM        Opportunity for questions and clarification was provided.       Patient transported with:

## 2020-07-06 NOTE — ANESTHESIA PROCEDURE NOTES
Spinal Block    Start time: 7/6/2020 9:01 AM  End time: 7/6/2020 9:05 AM  Performed by: Andrew Freeman MD  Authorized by: Andrew Freeman MD     Pre-procedure:   Indications: primary anesthetic  Preanesthetic Checklist: patient identified, risks and benefits discussed, anesthesia consent, site marked, patient being monitored and timeout performed    Timeout Time: 08:59          Spinal Block:   Patient Position:  Seated  Prep Region:  Lumbar  Prep: chlorhexidine and patient draped      Location:  L3-4  Technique:  Single shot        Needle:   Needle Type:  Quincke  Needle Gauge:  25 G  Attempts:  1      Events: CSF confirmed, no blood with aspiration and no paresthesia        Assessment:  Insertion:  Uncomplicated

## 2020-07-06 NOTE — PROGRESS NOTES
Problem: Self Care Deficits Care Plan (Adult)  Goal: *Acute Goals and Plan of Care (Insert Text)  Description: GOALS:   DISCHARGE GOALS (in preparation for going home/rehab):  3 days  1. Mr. Gabby Mas will perform one lower body dressing activity with minimal assistance with adaptive equipment to demonstrate improved functional mobility and safety. 2.  Mr. Gabby Mas will perform one lower body bathing activity with minimal  assistance with adaptive equipment to demonstrate improved functional mobility and safety. 3.  Mr. Gabby Mas will perform toileting/toilet transfer with contact guard assistance with adaptive equipment to demonstrate improved functional mobility and safety. 4.  Mr. Pierre Plan will perform shower transfer with contact guard assistance with adaptive equipment to demonstrate improved functional mobility and safety. 5.  Mr. Gabby Mas will state KANDI precautions with two verbal cues to demonstrate improved functional mobility and safety. Outcome: Progressing Towards Goal         JOINT CAMP OCCUPATIONAL THERAPY KANDI: Initial Assessment, Treatment Day: Day of Assessment, and PM 7/6/2020  INPATIENT: Hospital Day: 1  [place] / [place] / Product Type: Medicare /      NAME/AGE/GENDER: Jerry Casiano is a 68 y.o. male   PRIMARY DIAGNOSIS:  Primary osteoarthritis of right hip [M16.11]   Procedure(s) and Anesthesia Type:     * RIGHT HIP ARTHROPLASTY TOTAL / Justin Hobby - Spinal (Right)  ICD-10: Treatment Diagnosis:    · Pain in Right Hip (M25.551)  · Stiffness of Right Hip, Not elsewhere classified (M25.651)  · Other lack of cordination (R27.8)  · Difficulty in walking, Not elsewhere classified (R26.2)  · Other abnormalities of gait and mobility (R26.89)      ASSESSMENT:     Mr. Gabby Mas is s/p right KANDI and presents with decreased weight bearing on right LE and decreased independence with functional mobility and activities of daily living as compared to prior level of function and safety. Patient would benefit from skilled Occupational Therapy to maximize independence and safety with self-care task and functional mobility. Pt would also benefit from education on lower body adaptive equipment and hip precautions post-surgery in preparation for going home. Patient plans for further rehab at home with home health services and good family support. OT reviewed therapy schedule and plan of care with patient. Patient was able to transfer and perform self care skills as charted below. Patient instructed to call for assistance when needing to get up from the recliner and all needs in reach. Patient verbalized understanding of call light. He transferred to EOB and donned clothes. He ambulated in the room and then to the bathroom. He stood and urinated and  returned to recliner. This section established at most recent assessment   PROBLEM LIST (Impairments causing functional limitations):  1. Decreased Strength  2. Decreased ADL/Functional Activities  3. Decreased Transfer Abilities  4. Increased Pain  5. Increased Fatigue  6. Decreased Flexibility/Joint Mobility  7. Decreased Knowledge of Precautions   INTERVENTIONS PLANNED: (Benefits and precautions of occupational therapy have been discussed with the patient.)  1. Activities of daily living training  2. Adaptive equipment training  3. Balance training  4. Clothing management  5. Donning&doffing training  6. Theraputic activity     TREATMENT PLAN: Frequency/Duration: Follow patient 2 times to address above goals. Rehabilitation Potential For Stated Goals: Good     RECOMMENDED REHABILITATION/EQUIPMENT: (at time of discharge pending progress): Continue Skilled Therapy. OCCUPATIONAL PROFILE AND HISTORY:   History of Present Injury/Illness (Reason for Referral): Pt presents this date s/p (right) KANDI.    Past Medical History/Comorbidities:   Mr. Malcolm Schaefer  has a past medical history of Bradycardia, CAD (coronary artery disease), Essential hypertension, Hemochromatosis, Hyperlipidemia, Ill-defined condition, Lower back pain, Melanoma ([facility] (1986), OA (osteoarthritis), Thyroid disease, and Vasovagal syncope (2014). He also has no past medical history of Adverse effect of anesthesia, Chronic pain, Difficult intubation, Malignant hyperthermia due to anesthesia, Nausea & vomiting, or Pseudocholinesterase deficiency. Mr. Omid Mitchell  has a past surgical history that includes hx coronary stent placement (2007); hx orthopaedic (Right, 1975); hx shoulder arthroscopy (Right, 1984); and hx heent (Left, 1986). Social History/Living Environment:   Home Environment: Private residence  # Steps to Enter: 7  Hand Rails : Right  One/Two Story Residence: One story  Living Alone: No  Support Systems: Spouse/Significant Other/Partner  Patient Expects to be Discharged to[de-identified] Private residence  Current DME Used/Available at Home: None  Tub or Shower Type: Shower  Prior Level of Function/Work/Activity:  Mod I with ADLs     Number of Personal Factors/Comorbidities that affect the Plan of Care: Brief history (0):  LOW COMPLEXITY   ASSESSMENT OF OCCUPATIONAL PERFORMANCE[de-identified]   Most Recent Physical Functioning:   Balance  Sitting: Intact  Standing: With support;Pull to stand       Gross Assessment  AROM: Within functional limits(left LE)  Strength: Generally decreased, functional(left LE)            Coordination  Fine Motor Skills-Upper: Left Intact; Right Intact  Gross Motor Skills-Upper: Left Intact; Right Intact         Mental Status  Neurologic State: Alert; Appropriate for age  Orientation Level: Appropriate for age  Cognition: Appropriate decision making; Appropriate for age attention/concentration; Appropriate safety awareness; Follows commands  Perception: Appears intact  Perseveration: No perseveration noted  Safety/Judgement: Awareness of environment; Fall prevention          RLE AROM  R Hip Flexion: 80  R Hip ABduction: 10     Basic ADLs (From Assessment) Complex ADLs (From Assessment)   Basic ADL  Feeding: Supervision  Oral Facial Hygiene/Grooming: Supervision  Bathing: Moderate assistance  Upper Body Dressing: Supervision  Lower Body Dressing: Moderate assistance  Toileting: Minimum assistance     Grooming/Bathing/Dressing Activities of Daily Living     Cognitive Retraining  Safety/Judgement: Awareness of environment; Fall prevention                 Functional Transfers  Bathroom Mobility: Minimum assistance  Toilet Transfer : Minimum assistance  Shower Transfer: Minimum assistance     Bed/Mat Mobility  Supine to Sit: Contact guard assistance  Sit to Stand: Minimum assistance  Stand to Sit: Minimum assistance  Bed to Chair: Minimum assistance  Scooting: Contact guard assistance         Physical Skills Involved:  1. Balance  2. Strength  3. Activity Tolerance Cognitive Skills Affected (resulting in the inability to perform in a timely and safe manner): 1. none  Psychosocial Skills Affected:  1. none    Number of elements that affect the Plan of Care: 1-3:  LOW COMPLEXITY   CLINICAL DECISION MAKIN[address] [place] [address] AM-PAC 6 Clicks   Daily Activity Inpatient Short Form  How much help from another person does the patient currently need. .. Total A Lot A Little None   1. Putting on and taking off regular lower body clothing? [] 1   [x] 2   [] 3   [] 4   2. Bathing (including washing, rinsing, drying)? [] 1   [x] 2   [] 3   [] 4   3. Toileting, which includes using toilet, bedpan or urinal?   [] 1   [] 2   [x] 3   [] 4   4. Putting on and taking off regular upper body clothing? [] 1   [] 2   [] 3   [x] 4   5. Taking care of personal grooming such as brushing teeth? [] 1   [] 2   [] 3   [x] 4   6. Eating meals? [] 1   [] 2   [] 3   [x] 4   © , Trustees of [address] [place] [id number], under license to [employer].  All rights reserved     Score:  Initial: 19 Most Recent: X (Date: -- )    Interpretation of Tool:  Represents activities that are increasingly more difficult (i.e. Bed mobility, Transfers, Gait). Medical Necessity:     · Patient is expected to demonstrate progress in   · Self care skills and functional mobility  ·     Reason for Services/Other Comments:  · Patient continues to require skilled intervention due to   · Above listed deficits     Use of outcome tool(s) and clinical judgement create a POC that gives a: LOW COMPLEXITY            TREATMENT:   (In addition to Assessment/Re-Assessment sessions the following treatments were rendered)     Pre-treatment Symptoms/Complaints:    Pain: Initial:   Pain Intensity 1: 0  Post Session:  1/10 icepack given     Self Care: (10): Procedure(s) (per grid) utilized to improve and/or restore self-care/home management as related to dressing, toileting, and grooming. Required moderate visual, verbal, manual, and tactile cueing to facilitate activities of daily living skills and compensatory activities. Assessment/Reassessment  5min    Treatment/Session Assessment:     Response to Treatment:  tolerated well, up in recliner. Education:  [] Home Exercises  [x] Fall Precautions  [x] Hip Precautions [] Going Home Video  [] Knee/Hip Prosthesis Review  [x] Walker Management/Safety [x] Adaptive Equipment as Needed       Interdisciplinary Collaboration:   o Physical Therapist  o Occupational Therapist  o Registered Nurse    After treatment position/precautions:   o Up in chair  o Bed/Chair-wheels locked  o Bed in low position  o Caregiver at bedside  o Call light within reach  o RN notified  o Family at bedside     Compliance with Program/Exercises: Compliant all of the time. Recommendations/Intent for next treatment session:  Treatment next visit will focus on increasing Mr. Mathis Felty independence with bed mobility, transfers, self care, functional mobility, modalities for pain, and patient education.       Total Treatment Duration:10  OT Patient Time In/Time Out  Time In: 1310  Time Out: [address], OT

## 2020-07-07 VITALS
TEMPERATURE: 97.6 F | DIASTOLIC BLOOD PRESSURE: 70 MMHG | HEART RATE: 73 BPM | OXYGEN SATURATION: 97 % | WEIGHT: 207 LBS | SYSTOLIC BLOOD PRESSURE: 139 MMHG | HEIGHT: 72 IN | RESPIRATION RATE: 16 BRPM | BODY MASS INDEX: 28.04 KG/M2

## 2020-07-07 PROBLEM — M16.11 PRIMARY LOCALIZED OSTEOARTHRITIS OF RIGHT HIP: Status: ACTIVE | Noted: 2020-07-07

## 2020-07-07 LAB — HGB BLD-MCNC: 13.4 G/DL (ref 13.6–17.2)

## 2020-07-07 PROCEDURE — 97535 SELF CARE MNGMENT TRAINING: CPT

## 2020-07-07 PROCEDURE — 36415 COLL VENOUS BLD VENIPUNCTURE: CPT

## 2020-07-07 PROCEDURE — 74011250636 HC RX REV CODE- 250/636: Performed by: PHYSICIAN ASSISTANT

## 2020-07-07 PROCEDURE — 74011250637 HC RX REV CODE- 250/637: Performed by: PHYSICIAN ASSISTANT

## 2020-07-07 PROCEDURE — 97110 THERAPEUTIC EXERCISES: CPT

## 2020-07-07 PROCEDURE — 85018 HEMOGLOBIN: CPT

## 2020-07-07 PROCEDURE — 74011250636 HC RX REV CODE- 250/636: Performed by: INTERNAL MEDICINE

## 2020-07-07 PROCEDURE — 97116 GAIT TRAINING THERAPY: CPT

## 2020-07-07 PROCEDURE — 74011000250 HC RX REV CODE- 250: Performed by: PHYSICIAN ASSISTANT

## 2020-07-07 RX ORDER — ONDANSETRON 4 MG/1
4 TABLET, ORALLY DISINTEGRATING ORAL
Qty: 21 TAB | Refills: 0 | Status: SHIPPED | OUTPATIENT
Start: 2020-07-07 | End: 2020-07-14

## 2020-07-07 RX ADMIN — ACETAMINOPHEN 1000 MG: 500 TABLET, FILM COATED ORAL at 00:06

## 2020-07-07 RX ADMIN — SODIUM CHLORIDE 1000 ML: 900 INJECTION, SOLUTION INTRAVENOUS at 10:00

## 2020-07-07 RX ADMIN — CELECOXIB 200 MG: 200 CAPSULE ORAL at 08:40

## 2020-07-07 RX ADMIN — Medication 1 AMPULE: at 08:39

## 2020-07-07 RX ADMIN — DOCUSATE SODIUM 50MG AND SENNOSIDES 8.6MG 2 TABLET: 8.6; 5 TABLET, FILM COATED ORAL at 08:40

## 2020-07-07 RX ADMIN — ACETAMINOPHEN 1000 MG: 500 TABLET, FILM COATED ORAL at 06:17

## 2020-07-07 RX ADMIN — ASPIRIN 81 MG: 81 TABLET, COATED ORAL at 08:40

## 2020-07-07 RX ADMIN — CEFAZOLIN SODIUM 2 G: 100 INJECTION, POWDER, LYOPHILIZED, FOR SOLUTION INTRAVENOUS at 02:23

## 2020-07-07 RX ADMIN — LEVOTHYROXINE SODIUM 25 MCG: 50 TABLET ORAL at 06:17

## 2020-07-07 RX ADMIN — HYDROMORPHONE HYDROCHLORIDE 2 MG: 2 TABLET ORAL at 06:17

## 2020-07-07 RX ADMIN — MAGNESIUM GLUCONATE 500 MG ORAL TABLET 400 MG: 500 TABLET ORAL at 08:39

## 2020-07-07 NOTE — PROGRESS NOTES
Problem: Self Care Deficits Care Plan (Adult)  Goal: *Acute Goals and Plan of Care (Insert Text)  Description: GOALS:   DISCHARGE GOALS (in preparation for going home/rehab):  3 days  1. Mr. Juan Larose will perform one lower body dressing activity with minimal assistance with adaptive equipment to demonstrate improved functional mobility and safety. GOAL MET 7/7/2020  2. Mr. Juan Larose will perform one lower body bathing activity with minimal  assistance with adaptive equipment to demonstrate improved functional mobility and safety. GOAL MET 7/7/2020    3. Mr. Juan Larose will perform toileting/toilet transfer with contact guard assistance with adaptive equipment to demonstrate improved functional mobility and safety. GOAL MET 7/7/2020    4. Mr. Juan Larose will perform shower transfer with contact guard assistance with adaptive equipment to demonstrate improved functional mobility and safety. GOAL MET 7/7/2020    5. Mr. Juan Larose will state KANDI precautions with two verbal cues to demonstrate improved functional mobility and safety. GOAL MET 7/7/2020       Outcome: Progressing Towards Goal         JOINT CAMP OCCUPATIONAL THERAPY KANDI: Daily Note, Treatment Day: 2nd and AM 7/7/2020  INPATIENT: Hospital Day: 2  [place] / Product Type: Medicare /      NAME/AGE/GENDER: Cecilio Leon is a 68 y.o. male   PRIMARY DIAGNOSIS:  Primary osteoarthritis of right hip [M16.11]   Procedure(s) and Anesthesia Type:     * RIGHT HIP ARTHROPLASTY TOTAL / Amie Tonny - Spinal (Right)  ICD-10: Treatment Diagnosis:    · Pain in Right Hip (M25.551)  · Stiffness of Right Hip, Not elsewhere classified (M25.651)  · Other lack of cordination (R27.8)  · Difficulty in walking, Not elsewhere classified (R26.2)  · Other abnormalities of gait and mobility (R26.89)      ASSESSMENT:      Mr. Juan Larose is s/p right KANDI and presents with decreased weight bearing on right LE and decreased independence with functional mobility and activities of daily living. Patient completed shower and dressing as charted below in ADL grid and is ambulating with rolling walker and CGA to stand by assist.  Patient has met 5/5 goals and plans to return home with good family support. He finished getting dressed and felt nauseous. He was reclined in bedside chair and had syncope event. Nursing came to the room patient became responsive and vitals were taken 94/61 HR 45 O2 sat 97 %. Nurse present with patient. This section established at most recent assessment   PROBLEM LIST (Impairments causing functional limitations):  1. Decreased Strength  2. Decreased ADL/Functional Activities  3. Decreased Transfer Abilities  4. Increased Pain  5. Increased Fatigue  6. Decreased Flexibility/Joint Mobility  7. Decreased Knowledge of Precautions   INTERVENTIONS PLANNED: (Benefits and precautions of occupational therapy have been discussed with the patient.)  1. Activities of daily living training  2. Adaptive equipment training  3. Balance training  4. Clothing management  5. Donning&doffing training  6. Theraputic activity     TREATMENT PLAN: Frequency/Duration: Follow patient 2 times to address above goals. Rehabilitation Potential For Stated Goals: Good     RECOMMENDED REHABILITATION/EQUIPMENT: (at time of discharge pending progress): Continue Skilled Therapy. OCCUPATIONAL PROFILE AND HISTORY:   History of Present Injury/Illness (Reason for Referral): Pt presents this date s/p (right) KANDI. Past Medical History/Comorbidities:   Mr. Dougie Hernández  has a past medical history of Bradycardia, CAD (coronary artery disease), Essential hypertension, Hemochromatosis, Hyperlipidemia, Ill-defined condition, Lower back pain, Melanoma ([facility] [address] (1986), OA (osteoarthritis), Thyroid disease, and Vasovagal syncope (2014).  He also has no past medical history of Adverse effect of anesthesia, Chronic pain, Difficult intubation, Malignant hyperthermia due to anesthesia, Nausea & vomiting, or Pseudocholinesterase deficiency. Mr. Osmar Madrigal  has a past surgical history that includes hx coronary stent placement (2007); hx orthopaedic (Right, 1975); hx shoulder arthroscopy (Right, 1984); and hx heent (Left, 1986). Social History/Living Environment:   Home Environment: Private residence  # Steps to Enter: 7  Hand Rails : Right  One/Two Story Residence: One story  Living Alone: No  Support Systems: Spouse/Significant Other/Partner  Patient Expects to be Discharged to[de-identified] Private residence  Current DME Used/Available at Home: None  Tub or Shower Type: Shower  Prior Level of Function/Work/Activity:  Mod I with ADLs     Number of Personal Factors/Comorbidities that affect the Plan of Care: Brief history (0):  LOW COMPLEXITY   ASSESSMENT OF OCCUPATIONAL PERFORMANCE[de-identified]   Most Recent Physical Functioning:   Balance  Sitting: Intact  Standing: With support; Without support                              Mental Status  Neurologic State: Alert; Appropriate for age  Orientation Level: Appropriate for age  Cognition: Appropriate decision making; Appropriate for age attention/concentration; Appropriate safety awareness; Follows commands  Perception: Appears intact  Perseveration: No perseveration noted  Safety/Judgement: Awareness of environment; Fall prevention                Basic ADLs (From Assessment) Complex ADLs (From Assessment)   Basic ADL  Feeding: Supervision  Oral Facial Hygiene/Grooming: Supervision  Bathing: Moderate assistance  Type of Bath: Chlorhexidine (CHG), Shower  Upper Body Dressing: Supervision  Lower Body Dressing: Moderate assistance  Toileting: Minimum assistance     Grooming/Bathing/Dressing Activities of Daily Living   Grooming  Grooming Assistance: Stand-by assistance  Position Performed: Standing  Washing Face: Stand-by assistance  Washing Hands: Stand-by assistance  Brushing/Combing Hair: Stand-by assistance Cognitive Retraining  Safety/Judgement: Awareness of environment; Fall prevention Upper Body Bathing  Bathing Assistance: Stand-by assistance  Position Performed: Standing  Adaptive Equipment: Grab bar     Lower Body Bathing  Bathing Assistance: Minimum assistance  Perineal  : Stand-by assistance  Position Performed: Standing  Adaptive Equipment: Grab bar  Lower Body : Minimum assistance  Position Performed: Seated in chair;Standing  Adaptive Equipment: Grab bar;Long handled sponge; Tub bench     Upper Body Dressing Assistance  Dressing Assistance: Supervision  Pullover Shirt: Supervision Functional Transfers  Bathroom Mobility: Stand-by assistance;Contact guard assistance  Toilet Transfer : Stand-by assistance;Contact guard assistance  Shower Transfer: Stand-by assistance;Contact guard assistance  Adaptive Equipment: Bedside commode;Grab bars; Tub transfer bench   Lower Body Dressing Assistance  Dressing Assistance: Minimum assistance  Underpants: Contact guard assistance  Pants With Button/Zipper: Contact guard assistance  Socks: Minimum assistance  Slip on Shoes with Back: Contact guard assistance;Minimum assistance  Adaptive Equipment Used: Long handled shoe horn;Reacher;Sock aid; Walker Bed/Mat Mobility  Supine to Sit: Contact guard assistance  Sit to Stand: Contact guard assistance  Stand to Sit: Contact guard assistance  Bed to Chair: Contact guard assistance  Scooting: Contact guard assistance         Physical Skills Involved:  1. Balance  2. Strength  3. Activity Tolerance Cognitive Skills Affected (resulting in the inability to perform in a timely and safe manner): 1. none  Psychosocial Skills Affected:  1. none    Number of elements that affect the Plan of Care: 1-3:  LOW COMPLEXITY   CLINICAL DECISION MAKING:   [employer] AM-PAC 6 Clicks   Daily Activity Inpatient Short Form  How much help from another person does the patient currently need. .. Total A Lot A Little None   1. Putting on and taking off regular lower body clothing? [] 1   [x] 2   [] 3   [] 4   2.   Bathing (including washing, rinsing, drying)? [] 1   [x] 2   [] 3   [] 4   3. Toileting, which includes using toilet, bedpan or urinal?   [] 1   [] 2   [x] 3   [] 4   4. Putting on and taking off regular upper body clothing? [] 1   [] 2   [] 3   [x] 4   5. Taking care of personal grooming such as brushing teeth? [] 1   [] 2   [] 3   [x] 4   6. Eating meals? [] 1   [] 2   [] 3   [x] 4   © 2007, Trustees of [address] [place] [address] [address] [address], under license to [employer]. All rights reserved     Score:  Initial: 19 Most Recent: X (Date: -- )    Interpretation of Tool:  Represents activities that are increasingly more difficult (i.e. Bed mobility, Transfers, Gait). Medical Necessity:     · Patient is expected to demonstrate progress in   · Self care skills and functional mobility  ·     Reason for Services/Other Comments:  · Patient continues to require skilled intervention due to   · Above listed deficits     Use of outcome tool(s) and clinical judgement create a POC that gives a: LOW COMPLEXITY            TREATMENT:   (In addition to Assessment/Re-Assessment sessions the following treatments were rendered)     Pre-treatment Symptoms/Complaints:    Pain: Initial:   Pain Intensity 1: 5  Pain Location 1: Hip  Pain Orientation 1: Right  Pain Intervention(s) 1: Shower  Post Session:  4/10  rest     Self Care: (60): Procedure(s) (per grid) utilized to improve and/or restore self-care/home management as related to dressing, bathing, toileting and grooming. Required moderate visual, verbal, manual, and tactile cueing to facilitate activities of daily living skills, compensatory activities and hip kit training.     Assessment/Reassessment  5min    Treatment/Session Assessment:     Response to Treatment:  Reclined in bedside chair, all needs inreach    Education:  [] Home Exercises  [x] Fall Precautions  [x] Hip Precautions [] Going Home Video  [] Knee/Hip Prosthesis Review  [x] Walker Management/Safety [x] Adaptive Equipment as Needed Interdisciplinary Collaboration:   o Physical Therapist  o Occupational Therapist  o Registered Nurse    After treatment position/precautions:   o Up in chair  o Bed/Chair-wheels locked  o Bed in low position  o Call light within reach  o Nurse at bedside     Compliance with Program/Exercises: Compliant all of the time. Recommendations/Intent for next treatment session:  Treatment next visit will focus on increasing Mr. Papi Wild independence with bed mobility, transfers, self care, functional mobility, modalities for pain, and patient education.       Total Treatment Duration:60  OT Patient Time In/Time Out  Time In: 0725  Time Out: [address], OT

## 2020-07-07 NOTE — PROGRESS NOTES
Problem: Mobility Impaired (Adult and Pediatric)  Goal: *Acute Goals and Plan of Care (Insert Text)  Description: GOALS (1-4 days):  (1.)Mr. Gabby Mas will move from supine to sit and sit to supine  in bed with STAND BY ASSIST.    (2.)Mr. Gabby Mas will transfer from bed to chair and chair to bed with STAND BY ASSIST using the least restrictive device. (3.)Mr. Gabby Mas will ambulate with STAND BY ASSIST for 150 feet with the least restrictive device. (4.)Mr. Gabby Mas will ambulate up/down 5 steps with right railing with MINIMAL ASSIST with device as needed. (5.)Mr. Gabby Mas will state/observe JOSHUA precautions with 0 verbal cues. ________________________________________________________________________________________________     Outcome: Progressing Towards Goal     PHYSICAL THERAPY JOINT CAMP JOSHUA: Daily Note and AM 7/7/2020  INPATIENT: Hospital Day: 2  [place] / Product Type: Medicare /      NAME/AGE/GENDER: Jerry Casiano is a 68 y.o. male   PRIMARY DIAGNOSIS:  Primary osteoarthritis of right hip [M16.11]   Procedure(s) and Anesthesia Type:     * RIGHT HIP ARTHROPLASTY TOTAL / Justin Hobby - Spinal (Right)  ICD-10: Treatment Diagnosis:    · Pain in Right Hip (M25.551)  · Stiffness of Right Hip, Not elsewhere classified (M25.651)  · Difficulty in walking, Not elsewhere classified (R26.2)      ASSESSMENT:     Mr. Gabby Mas presents with decreased functional mobility and gait as well as decreased rom and strength of right LE s/p right joshua. He plans to go home with HHPT. Pt. Doing fine this am.  He reports feeling a little weak as he had an episode of low BP earlier this am.  He did well with gait with RW in the howard. He did joshua exercises with cues. No questions. He had no episodes or complaints. He will be around later this afternoon for afternoon PT. This section established at most recent assessment   PROBLEM LIST (Impairments causing functional limitations):  1.  Decreased Strength  2. Decreased ADL/Functional Activities  3. Decreased Transfer Abilities  4. Decreased Ambulation Ability/Technique  5. Decreased Balance  6. Increased Pain  7. Decreased Activity Tolerance  8. Decreased Flexibility/Joint Mobility  9. Decreased strength   INTERVENTIONS PLANNED: (Benefits and precautions of physical therapy have been discussed with the patient.)  1. bed mobility  2. gait training  3. home exercise program (HEP)  4. Range of Motion: active/assisted/passive  5. Therapeutic Activities  6. therapeutic exercise/strengthening  7. transfer training  8. Group Therapy     TREATMENT PLAN: Frequency/Duration: Follow patient BID for duration of hospital stay to address above goals. Rehabilitation Potential For Stated Goals: Good     RECOMMENDED REHABILITATION/EQUIPMENT: (at time of discharge pending progress): Continue Skilled Therapy and Home Health: Physical Therapy. HISTORY:   History of Present Injury/Illness (Reason for Referral):  S/p right joshua  Past Medical History/Comorbidities:   Mr. Filemon Bryant  has a past medical history of Bradycardia, CAD (coronary artery disease), Essential hypertension, Hemochromatosis, Hyperlipidemia, Ill-defined condition, Lower back pain, Melanoma ([facility] [address] (1986), OA (osteoarthritis), Thyroid disease, and Vasovagal syncope (2014). He also has no past medical history of Adverse effect of anesthesia, Chronic pain, Difficult intubation, Malignant hyperthermia due to anesthesia, Nausea & vomiting, or Pseudocholinesterase deficiency. Mr. Filemon Bryant  has a past surgical history that includes hx coronary stent placement (2007); hx orthopaedic (Right, 1975); hx shoulder arthroscopy (Right, 1984); and hx heent (Left, 1986).   Social History/Living Environment:   Home Environment: Private residence  # Steps to Enter: 7  Hand Rails : Right  One/Two Story Residence: One story  Living Alone: No  Support Systems: Spouse/Significant Other/Partner  Patient Expects to be Discharged to[de-identified] Private residence  Current DME Used/Available at Home: None  Tub or Shower Type: Shower  Prior Level of Function/Work/Activity:  independent   Number of Personal Factors/Comorbidities that affect the Plan of Care: 1-2: MODERATE COMPLEXITY   EXAMINATION:   Most Recent Physical Functioning:               RLE AROM  R Hip Flexion: 80  R Hip ABduction: 10            Bed Mobility  Supine to Sit: Contact guard assistance  Scooting: Contact guard assistance    Transfers  Sit to Stand: Stand-by assistance  Stand to Sit: Stand-by assistance  Bed to Chair: Contact guard assistance    Balance  Sitting: Intact  Standing: With support              Weight Bearing Status  Right Side Weight Bearing: As tolerated  Distance (ft): 100 Feet (ft)  Ambulation - Level of Assistance: Stand-by assistance  Assistive Device: Walker, rolling  Speed/Teresa: Delayed  Stance: Right decreased  Gait Abnormalities: Antalgic  Interventions: Safety awareness training;Verbal cues     Braces/Orthotics:     Right Hip Cold  Type: Cold/ice packs      Body Structures Involved:  1. Bones  2. Joints  3. Muscles  4. Ligaments Body Functions Affected:  1. Movement Related Activities and Participation Affected:  1. Mobility   Number of elements that affect the Plan of Care: 3: MODERATE COMPLEXITY   CLINICAL PRESENTATION:   Presentation: Stable and uncomplicated: LOW COMPLEXITY   CLINICAL DECISION MAKIN [place] [id number] AM-PAC 6 Clicks   Basic Mobility Inpatient Short Form  How much difficulty does the patient currently have. .. Unable A Lot A Little None   1. Turning over in bed (including adjusting bedclothes, sheets and blankets)? [] 1   [] 2   [x] 3   [] 4   2. Sitting down on and standing up from a chair with arms ( e.g., wheelchair, bedside commode, etc.)   [] 1   [] 2   [x] 3   [] 4   3. Moving from lying on back to sitting on the side of the bed?    [] 1   [] 2   [x] 3   [] 4   How much help from another person does the patient currently need... Total A Lot A Little None   4. Moving to and from a bed to a chair (including a wheelchair)? [] 1   [] 2   [x] 3   [] 4   5. Need to walk in hospital room? [] 1   [] 2   [x] 3   [] 4   6. Climbing 3-5 steps with a railing? [] 1   [] 2   [x] 3   [] 4   © 2007, Trustees of [address] [place] [address] [address] [address], under license to [employer]. All rights reserved     Score:  Initial: 18 Most Recent: X (Date: -- )    Interpretation of Tool:  Represents activities that are increasingly more difficult (i.e. Bed mobility, Transfers, Gait). Medical Necessity:     · Patient is expected to demonstrate progress in   · strength, range of motion, and balance  ·  to   · decrease assistance required with exercises and functional mobility   · . Reason for Services/Other Comments:  · Patient   · continues to require present interventions due to patient's inability to perform exercises and functional mobility independently   · . Use of outcome tool(s) and clinical judgement create a POC that gives a: Clear prediction of patient's progress: LOW COMPLEXITY            TREATMENT:   (In addition to Assessment/Re-Assessment sessions the following treatments were rendered)     Pre-treatment Symptoms/Complaints:  Hip sore  Pain Initial:      Post Session:  Did not rate     Therapeutic Exercise: (15 Minutes):  Exercises per grid below to improve mobility and strength. Required minimal visual, verbal, and manual cues to promote proper body alignment, promote proper body posture, and promote proper body mechanics. Progressed range and repetitions as indicated. Gait Training (15 Minutes):  Gait training to improve and/or restore physical functioning as related to mobility, strength and balance. Ambulated 100 Feet (ft) with Stand-by assistance using a Walker, rolling and minimal Safety awareness training;Verbal cues related to their stance phase to promote proper body alignment, promote proper body posture and promote proper body mechanics. Date:  7/6 Date:  7/7 Date:     ACTIVITY/EXERCISE AM PM AM PM AM PM   GROUP THERAPY  []  []  []  []  []  []   Ankle Pumps  10a 15a      Quad Sets  10a 15a      Gluteal Sets  10a 15a      Hip ABd/ADduction  10a 15a      Straight Leg Raises         Knee Slides  10a 15a      Short Arc Quads         Long Arc Quads         Chair Slides                  B = bilateral; AA = active assistive; A = active; P = passive      Treatment/Session Assessment:     Response to Treatment:  Did fine    Education:  [x] Home Exercises  [x] Fall Precautions  [x] Hip Precautions [] D/C Instruction Review  [x] Hip Prosthesis Review  [x] Walker Management/Safety [] Adaptive Equipment as Needed       Interdisciplinary Collaboration:   o Registered Nurse    After treatment position/precautions:   o Up in chair  o Bed/Chair-wheels locked  o Bed in low position  o Call light within reach    Compliance with Program/Exercises: Will assess as treatment progresses. No questions. Recommendations/Intent for next treatment session:  Treatment next visit will focus on increasing Mr. Merrilee Carrel independence with bed mobility, transfers, gait training, strength/ROM exercises, modalities for pain, and patient education.       Total Treatment Duration:  PT Patient Time In/Time Out  Time In: 1035  Time Out: Ace [address], PT

## 2020-07-07 NOTE — DISCHARGE INSTRUCTIONS
Patient Education        Hip Replacement Surgery: What to Expect at the Hospital  Your Recovery  After hip replacement surgery, you will be taken to the recovery room. In a few hours, you will go to your hospital room. You may see a metal triangle called a trapeze over your bed. You can use this to help move yourself around in bed. You will be very tired and will want to rest. Your nurse may also help turn you as you rest.  You will probably still have a tube that drains urine from your bladder (urinary catheter), and you will probably get fluids through a tube in your vein called an IV. You may also have a drain near the cut (incision) on your hip. You may not feel hungry. You may feel sick to your stomach or constipated for a couple of days. This is normal. Your nurse may give you stool softeners or laxatives to help with constipation. You may have stockings that put pressure on your legs to prevent blood clots. Your nurse may also give you medicines and exercise instructions to help prevent clots. Most people get out of bed with help on the day of surgery or the next day. Your doctor will let you know if you will stay in the hospital or if you can go home the day of surgery. Having surgery can be stressful. The information below tells you what to expect. Each person has a different experience and recovers at a different pace. What will happen in the hospital?  Pain and pain medicine  · You will receive medicine to help control pain. Some pain medicines are given through an IV and some are taken by mouth. · Take it as needed, but remember that it is easier to prevent pain before it starts than to stop it once it has started. · If you still have a lot of pain after you take your medicine, tell your nurse. You may need new medicine or to get the medicine in a different form. · You may also have some mild pain in your back. Changing your position in bed may help this.  Tell your nurse you want to turn, and he or she will help you. Other medicines  · Your doctor will probably give you blood thinners to prevent blood clots in your leg. You take this medicine during your hospital stay, and you may also take it after you go home. · Your doctor may give you antibiotics for about 24 hours after surgery. Rehabilitation  · Your rehabilitation (rehab) will probably begin the day of your surgery. Your physical therapist will get you started. It may be painful to exercise at first, but your nurse will give you pain medicine if you need it. · Your physical therapist will help you walk, go up and down stairs, and get in and out of bed and chairs. He or she will help improve the range of motion and strength in your hip. Your physical therapist will teach you positions and motions that will help keep your hip from popping out of the socket (dislocating). This is a very important part of your therapy. · How quickly you regain strength and motion and do things on your own depends on how well you follow your physical therapy. Your physical therapist will teach you the exercises, but you must do them yourself. · An occupational therapist will work with you. He or she will teach you how to bathe, dress, and do daily activities. You may need tools to help with everyday activities. Tools include shower stools, shoehorns, and long-handled sponges. Diet  · You will get liquids at first, but you can begin to eat your normal diet when you feel better. If your stomach is upset, your nurse will probably bring you bland, low-fat foods like plain rice, broiled chicken, toast, and yogurt. · You may have more fiber added to your meals to prevent constipation. Incision care  · You will have a bandage over your incision. Your nurse will care for this. Other instructions  · Your nurse or respiratory therapist will have you do breathing and coughing exercises to prevent problems such as pneumonia.  Breathe in deeply through your nose, and slowly breathe out through your mouth. Do this 3 times, and then cough 2 times. · You may have a device that you suck air through to help keep your lungs healthy (incentive spirometer). Use this as your nurse or therapist tells you to. When should you call for help? · You have trouble breathing. · You have a cough, shortness of breath, or chest pain. · You are sick to your stomach or cannot keep fluids down. · You have signs of a blood clot, such as:  ? Pain in your calf, back of the knee, thigh, or groin. ? Redness and swelling in your leg or groin. · You are in pain, or your pain does not get better after you take pain medicine. · You have loose stitches, or your incision comes open. · You have signs of infection, such as:  ? Increased pain, swelling, warmth, or redness. ? Red streaks leading from the incision. ? Pus draining from the incision. ? A fever. Where can you learn more? Go to http://janey-devin.info/  Enter J754 in the search box to learn more about \"Hip Replacement Surgery: What to Expect at the Hospital.\"  Current as of: March 2, 2020               Content Version: 12.5  © 0284-8240 Healthwise, [employer]. Care instructions adapted under license by [employer] (which disclaims liability or warranty for this information). If you have questions about a medical condition or this instruction, always ask your healthcare professional. [place] [address] any warranty or liability for your use of this information.

## 2020-07-07 NOTE — PROGRESS NOTES
Problem: Mobility Impaired (Adult and Pediatric)  Goal: *Acute Goals and Plan of Care (Insert Text)  Description: GOALS (1-4 days):  (1.)Mr. Gus Daniels will move from supine to sit and sit to supine  in bed with STAND BY ASSIST.    (2.)Mr. Gus Daniels will transfer from bed to chair and chair to bed with STAND BY ASSIST using the least restrictive device. Goal met  (3.)Mr. Gus Daniels will ambulate with STAND BY ASSIST for 150 feet with the least restrictive device. Goal met  (4.)Mr. Gus Daniels will ambulate up/down 5 steps with right railing with MINIMAL ASSIST with device as needed. Goal met  (5.)Mr. Gus Daniels will state/observe KANDI precautions with 0 verbal cues. Goal met  ________________________________________________________________________________________________     Outcome: Progressing Towards Goal     PHYSICAL THERAPY JOINT CAMP KANDI: Daily Note and PM 7/7/2020  INPATIENT: Hospital Day: 2  [place] / [place] / Product Type: Medicare /      NAME/AGE/GENDER: Tanvir Blankenship is a 68 y.o. male   PRIMARY DIAGNOSIS:  Primary osteoarthritis of right hip [M16.11]   Procedure(s) and Anesthesia Type:     * RIGHT HIP ARTHROPLASTY TOTAL / Arielle Safia - Spinal (Right)  ICD-10: Treatment Diagnosis:    · Pain in Right Hip (M25.551)  · Stiffness of Right Hip, Not elsewhere classified (M25.651)  · Difficulty in walking, Not elsewhere classified (R26.2)      ASSESSMENT:     Mr. Gus Daniels presents with decreased functional mobility and gait as well as decreased rom and strength of right LE s/p right kandi. He plans to go home with HHPT. Pt. Doing well and hopes to go home today. He reports doing well without any further episodes. He ambulated an increased distance with RW and did stairs without any problems this afternoon. He did kandi exercises with cues only, he did them on his own. Reviewed safety and kandi precautions. He had no questions or concerns about going home. Wife present.     This section established at most recent assessment   PROBLEM LIST (Impairments causing functional limitations):  1. Decreased Strength  2. Decreased ADL/Functional Activities  3. Decreased Transfer Abilities  4. Decreased Ambulation Ability/Technique  5. Decreased Balance  6. Increased Pain  7. Decreased Activity Tolerance  8. Decreased Flexibility/Joint Mobility  9. Decreased strength   INTERVENTIONS PLANNED: (Benefits and precautions of physical therapy have been discussed with the patient.)  1. bed mobility  2. gait training  3. home exercise program (HEP)  4. Range of Motion: active/assisted/passive  5. Therapeutic Activities  6. therapeutic exercise/strengthening  7. transfer training  8. Group Therapy     TREATMENT PLAN: Frequency/Duration: Follow patient BID for duration of hospital stay to address above goals. Rehabilitation Potential For Stated Goals: Good     RECOMMENDED REHABILITATION/EQUIPMENT: (at time of discharge pending progress): Continue Skilled Therapy and Home Health: Physical Therapy. HISTORY:   History of Present Injury/Illness (Reason for Referral):  S/p right joshua  Past Medical History/Comorbidities:   Mr. Ene Ibanez  has a past medical history of Bradycardia, CAD (coronary artery disease), Essential hypertension, Hemochromatosis, Hyperlipidemia, Ill-defined condition, Lower back pain, Melanoma ([facility] [address] (1986), OA (osteoarthritis), Thyroid disease, and Vasovagal syncope (2014). He also has no past medical history of Adverse effect of anesthesia, Chronic pain, Difficult intubation, Malignant hyperthermia due to anesthesia, Nausea & vomiting, or Pseudocholinesterase deficiency. Mr. Ene Ibanez  has a past surgical history that includes hx coronary stent placement (2007); hx orthopaedic (Right, 1975); hx shoulder arthroscopy (Right, 1984); and hx heent (Left, 1986).   Social History/Living Environment:   Home Environment: Private residence  # Steps to Enter: 7  Hand Rails : Right  One/Two Story Residence: One story  Living Alone: No  Support Systems: Spouse/Significant Other/Partner  Patient Expects to be Discharged to[de-identified] Private residence  Current DME Used/Available at Home: None  Tub or Shower Type: Shower  Prior Level of Function/Work/Activity:  independent   Number of Personal Factors/Comorbidities that affect the Plan of Care: 1-2: MODERATE COMPLEXITY   EXAMINATION:   Most Recent Physical Functioning:               RLE AROM  R Hip Flexion: 80  R Hip ABduction: 10            Bed Mobility  Supine to Sit: Contact guard assistance  Scooting: Contact guard assistance    Transfers  Sit to Stand: Stand-by assistance;Supervision  Stand to Sit: Supervision;Stand-by assistance  Bed to Chair: Contact guard assistance    Balance  Sitting: Intact  Standing: With support              Weight Bearing Status  Right Side Weight Bearing: As tolerated  Distance (ft): 300 Feet (ft)  Ambulation - Level of Assistance: Stand-by assistance;Supervision  Assistive Device: Walker, rolling  Speed/Teresa: Delayed  Stance: Right decreased  Gait Abnormalities: Antalgic  Number of Stairs Trained: 6  Stairs - Level of Assistance: Stand-by assistance  Rail Use: Right (cane in left)  Interventions: Safety awareness training;Verbal cues     Braces/Orthotics:     Right Hip Cold  Type: Cold/ice packs      Body Structures Involved:  1. Bones  2. Joints  3. Muscles  4. Ligaments Body Functions Affected:  1. Movement Related Activities and Participation Affected:  1. Mobility   Number of elements that affect the Plan of Care: 3: MODERATE COMPLEXITY   CLINICAL PRESENTATION:   Presentation: Stable and uncomplicated: LOW COMPLEXITY   CLINICAL DECISION MAKIN [facility] [address] [id number] AM-PAC 6 Clicks   Basic Mobility Inpatient Short Form  How much difficulty does the patient currently have. .. Unable A Lot A Little None   1. Turning over in bed (including adjusting bedclothes, sheets and blankets)? [] 1   [] 2   [x] 3   [] 4   2.   Sitting down on and standing up from a chair with arms ( e.g., wheelchair, bedside commode, etc.)   [] 1   [] 2   [x] 3   [] 4   3. Moving from lying on back to sitting on the side of the bed? [] 1   [] 2   [x] 3   [] 4   How much help from another person does the patient currently need. .. Total A Lot A Little None   4. Moving to and from a bed to a chair (including a wheelchair)? [] 1   [] 2   [x] 3   [] 4   5. Need to walk in hospital room? [] 1   [] 2   [x] 3   [] 4   6. Climbing 3-5 steps with a railing? [] 1   [] 2   [x] 3   [] 4   © 2007, Trustees of [address] [place] [address] [address] [address], under license to [employer]. All rights reserved     Score:  Initial: 18 Most Recent: X (Date: -- )    Interpretation of Tool:  Represents activities that are increasingly more difficult (i.e. Bed mobility, Transfers, Gait). Medical Necessity:     · Patient is expected to demonstrate progress in   · strength, range of motion, and balance  ·  to   · decrease assistance required with exercises and functional mobility   · . Reason for Services/Other Comments:  · Patient   · continues to require present interventions due to patient's inability to perform exercises and functional mobility independently   · . Use of outcome tool(s) and clinical judgement create a POC that gives a: Clear prediction of patient's progress: LOW COMPLEXITY            TREATMENT:   (In addition to Assessment/Re-Assessment sessions the following treatments were rendered)     Pre-treatment Symptoms/Complaints:  Hip sore  Pain Initial:      Post Session:   Yes, did not rate but no complaints     Therapeutic Exercise: (15 Minutes):  Exercises per grid below to improve mobility and strength. Required minimal visual, verbal, and manual cues to promote proper body alignment, promote proper body posture, and promote proper body mechanics. Progressed range and repetitions as indicated.   Gait Training (15 Minutes):  Gait training to improve and/or restore physical functioning as related to mobility, strength and balance. Ambulated 300 Feet (ft) with Stand-by assistance;Supervision using a Walker, rolling and minimal Safety awareness training;Verbal cues related to their stance phase to promote proper body alignment, promote proper body posture and promote proper body mechanics. Date:  7/6 Date:  7/7 Date:     ACTIVITY/EXERCISE AM PM AM PM AM PM   GROUP THERAPY  []  []  []  []  []  []   Ankle Pumps  10a 15a 15a     Quad Sets  10a 15a 15a     Gluteal Sets  10a 15a 15a     Hip ABd/ADduction  10a 15a 15a     Straight Leg Raises         Knee Slides  10a 15a 15a     Short Arc Quads    15a     Long Arc Quads    15a     Chair Slides                  B = bilateral; AA = active assistive; A = active; P = passive      Treatment/Session Assessment:     Response to Treatment:  Did well this pm    Education:  [x] Home Exercises  [x] Fall Precautions  [x] Hip Precautions [x] D/C Instruction Review  [x] Hip Prosthesis Review  [x] Walker Management/Safety [] Adaptive Equipment as Needed       Interdisciplinary Collaboration:   o Registered Nurse    After treatment position/precautions:   o Up in chair  o Bed/Chair-wheels locked  o Bed in low position  o Caregiver at bedside  o Call light within reach  o Family at bedside    Compliance with Program/Exercises: Compliant most of the time. No questions. Recommendations/Intent for next treatment session:  Treatment next visit will focus on increasing Mr. Raechel Cheadle independence with bed mobility, transfers, gait training, strength/ROM exercises, modalities for pain, and patient education.       Total Treatment Duration:  PT Patient Time In/Time Out  Time In: 1310  Time Out: 5200 Patrick Lanaz PT

## 2020-07-07 NOTE — PROGRESS NOTES
Pt rating his pain 6/10,  2 mg dilaudid given PO along with the scheduled tylenol 1000 mg. Pain medication was offered earlier but pt declined.

## 2020-07-07 NOTE — PROGRESS NOTES
Called by RN as patient had syncopal event. Orthostatics positive, showing SBPs in the 80s while supine. Will get 1L fluid bolus now and monitor on telemetry.

## 2020-07-07 NOTE — PROGRESS NOTES
Pt feeling nauseated,  4 mg zofran ODT given PO,  Dressing to right hip clean dry and intact, pt has good NV status,  Strong plantarflexion and dorsiflexion, bilaterally. Denies pain at this time.

## 2020-07-07 NOTE — PROGRESS NOTES
2020         Post Op day: 1 Day Post-OpProcedure(s) (LRB):  RIGHT HIP ARTHROPLASTY TOTAL / Mariana Edwards (Right)      Admit Date: 2020  Admit Diagnosis: Primary osteoarthritis of right hip [M16.11]  Osteoarthritis of right hip [M16.11]  Primary osteoarthritis of right hip [M16.11]    LAB:    Recent Results (from the past 24 hour(s))   HEMOGLOBIN    Collection Time: 20  6:43 PM   Result Value Ref Range    HGB 14.6 13.6 - 17.2 g/dL   HEMOGLOBIN    Collection Time: 20  3:31 AM   Result Value Ref Range    HGB 13.4 (L) 13.6 - 17.2 g/dL     Vital Signs:    Patient Vitals for the past 8 hrs:   BP Temp Pulse Resp SpO2   20 0402 110/52 97.4 °F (36.3 °C) 62 16 97 %   20 0005 153/82 97.4 °F (36.3 °C) 68 16 98 %     Temp (24hrs), Av.7 °F (36.5 °C), Min:97.4 °F (36.3 °C), Max:98 °F (36.7 °C)    Body mass index is 28.07 kg/m². Pain Control:   Pain Assessment  Pain Scale 1: Numeric (0 - 10)  Pain Intensity 1: 6  Pain Onset 1: YRS  Pain Location 1: Hip  Pain Orientation 1: Right  Pain Description 1: Aching  Pain Intervention(s) 1: Medication (see MAR)    Subjective: Doing well, No complaints, No SOB, No Chest Pain, Some nausea and vomiting     Objective: Vital Signs are Stable, No Acute Distress, Alert and Oriented, Dressing is dry,  Neurovascular exam is normal.       PT/OT:         Activity Response:  Tolerated well  Assistive Device: Walker (comment)  RLE AROM  R Hip Flexion: 80  R Hip ABduction: 10             Wieght Bearing Status: WBAT    Meds:  [unfilled]  [unfilled]  [unfilled]    Assessment:   Patient Active Problem List   Diagnosis Code    Coronary artery disease involving native coronary artery of native heart without angina pectoris I25.10    Essential hypertension I10    Pure hypercholesterolemia E78.00    Bradycardia R00.1    Osteoarthritis of right hip M16.11    Primary osteoarthritis of right hip M16.11    Status post right hip replacement Z96.641             Plan: Continue Physical Therapy, Monitor labs, home today         Signed By: Elvis Gongora MD

## 2020-07-07 NOTE — PROGRESS NOTES
07/06/20 2101   Oxygen Therapy   O2 Sat (%) 96 %   Pulse via Oximetry 64 beats per minute   O2 Device Room air   Patient placed on continuous sat monitor. Alarms set and data cleared. No distress noted at this time.

## 2020-07-08 ENCOUNTER — HOME CARE VISIT (OUTPATIENT)
Dept: SCHEDULING | Facility: HOME HEALTH | Age: 70
End: 2020-07-08
Payer: MEDICARE

## 2020-07-08 VITALS
TEMPERATURE: 98.9 F | DIASTOLIC BLOOD PRESSURE: 74 MMHG | SYSTOLIC BLOOD PRESSURE: 146 MMHG | HEART RATE: 71 BPM | OXYGEN SATURATION: 98 % | RESPIRATION RATE: 16 BRPM

## 2020-07-08 PROCEDURE — G0151 HHCP-SERV OF PT,EA 15 MIN: HCPCS

## 2020-07-08 PROCEDURE — 3331090001 HH PPS REVENUE CREDIT

## 2020-07-08 PROCEDURE — 3331090002 HH PPS REVENUE DEBIT

## 2020-07-08 PROCEDURE — 400013 HH SOC

## 2020-07-09 PROCEDURE — 3331090002 HH PPS REVENUE DEBIT

## 2020-07-09 PROCEDURE — 3331090001 HH PPS REVENUE CREDIT

## 2020-07-10 ENCOUNTER — HOME CARE VISIT (OUTPATIENT)
Dept: HOME HEALTH SERVICES | Facility: HOME HEALTH | Age: 70
End: 2020-07-10
Payer: MEDICARE

## 2020-07-10 VITALS
HEART RATE: 82 BPM | SYSTOLIC BLOOD PRESSURE: 140 MMHG | DIASTOLIC BLOOD PRESSURE: 74 MMHG | RESPIRATION RATE: 18 BRPM | TEMPERATURE: 99 F

## 2020-07-10 PROCEDURE — 3331090001 HH PPS REVENUE CREDIT

## 2020-07-10 PROCEDURE — 3331090002 HH PPS REVENUE DEBIT

## 2020-07-10 PROCEDURE — G0151 HHCP-SERV OF PT,EA 15 MIN: HCPCS

## 2020-07-11 PROCEDURE — 3331090001 HH PPS REVENUE CREDIT

## 2020-07-11 PROCEDURE — 3331090002 HH PPS REVENUE DEBIT

## 2020-07-12 PROCEDURE — 3331090002 HH PPS REVENUE DEBIT

## 2020-07-12 PROCEDURE — 3331090001 HH PPS REVENUE CREDIT

## 2020-07-13 ENCOUNTER — HOME CARE VISIT (OUTPATIENT)
Dept: SCHEDULING | Facility: HOME HEALTH | Age: 70
End: 2020-07-13
Payer: MEDICARE

## 2020-07-13 VITALS
DIASTOLIC BLOOD PRESSURE: 62 MMHG | HEART RATE: 62 BPM | TEMPERATURE: 97.3 F | RESPIRATION RATE: 17 BRPM | SYSTOLIC BLOOD PRESSURE: 124 MMHG

## 2020-07-13 PROCEDURE — 3331090001 HH PPS REVENUE CREDIT

## 2020-07-13 PROCEDURE — G0157 HHC PT ASSISTANT EA 15: HCPCS

## 2020-07-13 PROCEDURE — 3331090002 HH PPS REVENUE DEBIT

## 2020-07-14 PROCEDURE — 3331090002 HH PPS REVENUE DEBIT

## 2020-07-14 PROCEDURE — 3331090001 HH PPS REVENUE CREDIT

## 2020-07-15 ENCOUNTER — HOME CARE VISIT (OUTPATIENT)
Dept: SCHEDULING | Facility: HOME HEALTH | Age: 70
End: 2020-07-15
Payer: MEDICARE

## 2020-07-15 VITALS
HEART RATE: 64 BPM | DIASTOLIC BLOOD PRESSURE: 64 MMHG | SYSTOLIC BLOOD PRESSURE: 124 MMHG | RESPIRATION RATE: 18 BRPM | TEMPERATURE: 97.3 F

## 2020-07-15 PROCEDURE — 3331090001 HH PPS REVENUE CREDIT

## 2020-07-15 PROCEDURE — 3331090002 HH PPS REVENUE DEBIT

## 2020-07-15 PROCEDURE — G0157 HHC PT ASSISTANT EA 15: HCPCS

## 2020-07-16 PROCEDURE — 3331090001 HH PPS REVENUE CREDIT

## 2020-07-16 PROCEDURE — 3331090002 HH PPS REVENUE DEBIT

## 2020-07-17 ENCOUNTER — HOME CARE VISIT (OUTPATIENT)
Dept: SCHEDULING | Facility: HOME HEALTH | Age: 70
End: 2020-07-17
Payer: MEDICARE

## 2020-07-17 VITALS
RESPIRATION RATE: 17 BRPM | HEART RATE: 67 BPM | DIASTOLIC BLOOD PRESSURE: 60 MMHG | SYSTOLIC BLOOD PRESSURE: 130 MMHG | TEMPERATURE: 97.9 F

## 2020-07-17 PROCEDURE — 3331090002 HH PPS REVENUE DEBIT

## 2020-07-17 PROCEDURE — G0157 HHC PT ASSISTANT EA 15: HCPCS

## 2020-07-17 PROCEDURE — 3331090001 HH PPS REVENUE CREDIT

## 2020-07-18 PROCEDURE — 3331090001 HH PPS REVENUE CREDIT

## 2020-07-18 PROCEDURE — 3331090002 HH PPS REVENUE DEBIT

## 2020-07-19 PROCEDURE — 3331090001 HH PPS REVENUE CREDIT

## 2020-07-19 PROCEDURE — 3331090002 HH PPS REVENUE DEBIT

## 2020-07-20 ENCOUNTER — HOME CARE VISIT (OUTPATIENT)
Dept: SCHEDULING | Facility: HOME HEALTH | Age: 70
End: 2020-07-20
Payer: MEDICARE

## 2020-07-20 VITALS
TEMPERATURE: 97.2 F | SYSTOLIC BLOOD PRESSURE: 122 MMHG | HEART RATE: 68 BPM | RESPIRATION RATE: 17 BRPM | DIASTOLIC BLOOD PRESSURE: 62 MMHG

## 2020-07-20 PROCEDURE — 3331090001 HH PPS REVENUE CREDIT

## 2020-07-20 PROCEDURE — 3331090002 HH PPS REVENUE DEBIT

## 2020-07-20 PROCEDURE — A4649 SURGICAL SUPPLIES: HCPCS

## 2020-07-20 PROCEDURE — G0157 HHC PT ASSISTANT EA 15: HCPCS

## 2020-07-21 PROCEDURE — 3331090001 HH PPS REVENUE CREDIT

## 2020-07-21 PROCEDURE — 3331090002 HH PPS REVENUE DEBIT

## 2020-07-22 ENCOUNTER — HOME CARE VISIT (OUTPATIENT)
Dept: SCHEDULING | Facility: HOME HEALTH | Age: 70
End: 2020-07-22
Payer: MEDICARE

## 2020-07-22 VITALS
DIASTOLIC BLOOD PRESSURE: 62 MMHG | HEART RATE: 60 BPM | RESPIRATION RATE: 17 BRPM | TEMPERATURE: 97.2 F | SYSTOLIC BLOOD PRESSURE: 136 MMHG

## 2020-07-22 PROCEDURE — 3331090001 HH PPS REVENUE CREDIT

## 2020-07-22 PROCEDURE — G0157 HHC PT ASSISTANT EA 15: HCPCS

## 2020-07-22 PROCEDURE — 3331090002 HH PPS REVENUE DEBIT

## 2020-07-23 PROCEDURE — 3331090001 HH PPS REVENUE CREDIT

## 2020-07-23 PROCEDURE — 3331090002 HH PPS REVENUE DEBIT

## 2020-07-24 PROCEDURE — 3331090001 HH PPS REVENUE CREDIT

## 2020-07-24 PROCEDURE — 3331090002 HH PPS REVENUE DEBIT

## 2020-07-25 PROCEDURE — 3331090002 HH PPS REVENUE DEBIT

## 2020-07-25 PROCEDURE — 3331090001 HH PPS REVENUE CREDIT

## 2020-07-26 PROCEDURE — 3331090001 HH PPS REVENUE CREDIT

## 2020-07-26 PROCEDURE — 3331090002 HH PPS REVENUE DEBIT

## 2020-07-27 PROCEDURE — 3331090001 HH PPS REVENUE CREDIT

## 2020-07-27 PROCEDURE — 3331090002 HH PPS REVENUE DEBIT

## 2020-07-28 ENCOUNTER — HOME CARE VISIT (OUTPATIENT)
Dept: SCHEDULING | Facility: HOME HEALTH | Age: 70
End: 2020-07-28
Payer: MEDICARE

## 2020-07-28 VITALS
TEMPERATURE: 97.7 F | RESPIRATION RATE: 17 BRPM | SYSTOLIC BLOOD PRESSURE: 130 MMHG | DIASTOLIC BLOOD PRESSURE: 66 MMHG | HEART RATE: 62 BPM

## 2020-07-28 PROCEDURE — 3331090002 HH PPS REVENUE DEBIT

## 2020-07-28 PROCEDURE — G0157 HHC PT ASSISTANT EA 15: HCPCS

## 2020-07-28 PROCEDURE — 3331090001 HH PPS REVENUE CREDIT

## 2020-07-29 PROCEDURE — 3331090001 HH PPS REVENUE CREDIT

## 2020-07-29 PROCEDURE — 3331090002 HH PPS REVENUE DEBIT

## 2020-07-30 ENCOUNTER — HOME CARE VISIT (OUTPATIENT)
Dept: SCHEDULING | Facility: HOME HEALTH | Age: 70
End: 2020-07-30
Payer: MEDICARE

## 2020-07-30 VITALS
SYSTOLIC BLOOD PRESSURE: 138 MMHG | HEART RATE: 64 BPM | DIASTOLIC BLOOD PRESSURE: 78 MMHG | TEMPERATURE: 97.8 F | RESPIRATION RATE: 18 BRPM

## 2020-07-30 PROCEDURE — 3331090002 HH PPS REVENUE DEBIT

## 2020-07-30 PROCEDURE — 3331090001 HH PPS REVENUE CREDIT

## 2020-07-30 PROCEDURE — G0151 HHCP-SERV OF PT,EA 15 MIN: HCPCS

## 2020-07-31 PROCEDURE — 3331090002 HH PPS REVENUE DEBIT

## 2020-07-31 PROCEDURE — 3331090001 HH PPS REVENUE CREDIT

## 2020-08-01 PROCEDURE — 3331090001 HH PPS REVENUE CREDIT

## 2020-08-01 PROCEDURE — 3331090002 HH PPS REVENUE DEBIT

## 2020-08-02 PROCEDURE — 3331090001 HH PPS REVENUE CREDIT

## 2020-08-02 PROCEDURE — 3331090002 HH PPS REVENUE DEBIT

## 2021-11-05 ENCOUNTER — APPOINTMENT (OUTPATIENT)
Dept: GENERAL RADIOLOGY | Age: 71
DRG: 871 | End: 2021-11-05
Attending: EMERGENCY MEDICINE
Payer: MEDICARE

## 2021-11-05 ENCOUNTER — HOSPITAL ENCOUNTER (INPATIENT)
Age: 71
LOS: 3 days | Discharge: HOME HEALTH | DRG: 871 | End: 2021-11-08
Attending: EMERGENCY MEDICINE | Admitting: FAMILY MEDICINE
Payer: MEDICARE

## 2021-11-05 DIAGNOSIS — J96.01 ACUTE HYPOXEMIC RESPIRATORY FAILURE DUE TO COVID-19 (HCC): Primary | ICD-10-CM

## 2021-11-05 DIAGNOSIS — U07.1 ACUTE HYPOXEMIC RESPIRATORY FAILURE DUE TO COVID-19 (HCC): Primary | ICD-10-CM

## 2021-11-05 PROBLEM — R74.01 ELEVATED TRANSAMINASE LEVEL: Status: ACTIVE | Noted: 2021-11-05

## 2021-11-05 PROBLEM — Z28.310 COVID-19 VACCINE FIRST DOSE NOT ADMINISTERED: Status: ACTIVE | Noted: 2021-11-05

## 2021-11-05 PROBLEM — E03.9 HYPOTHYROIDISM: Chronic | Status: ACTIVE | Noted: 2021-11-01

## 2021-11-05 PROBLEM — E03.9 HYPOTHYROIDISM: Status: ACTIVE | Noted: 2021-11-01

## 2021-11-05 PROBLEM — A41.89 SEPSIS DUE TO COVID-19 (HCC): Status: ACTIVE | Noted: 2021-11-05

## 2021-11-05 PROBLEM — Z28.9 COVID-19 VACCINE FIRST DOSE NOT ADMINISTERED: Status: ACTIVE | Noted: 2021-11-05

## 2021-11-05 LAB
ALBUMIN SERPL-MCNC: 2.5 G/DL (ref 3.2–4.6)
ALBUMIN/GLOB SERPL: 0.6 {RATIO} (ref 1.2–3.5)
ALP SERPL-CCNC: 58 U/L (ref 50–136)
ALT SERPL-CCNC: 80 U/L (ref 12–65)
ANION GAP SERPL CALC-SCNC: 4 MMOL/L (ref 7–16)
AST SERPL-CCNC: 71 U/L (ref 15–37)
BASOPHILS # BLD: 0 K/UL (ref 0–0.2)
BASOPHILS NFR BLD: 0 % (ref 0–2)
BILIRUB SERPL-MCNC: 1 MG/DL (ref 0.2–1.1)
BUN SERPL-MCNC: 13 MG/DL (ref 8–23)
CALCIUM SERPL-MCNC: 8.4 MG/DL (ref 8.3–10.4)
CHLORIDE SERPL-SCNC: 102 MMOL/L (ref 98–107)
CO2 SERPL-SCNC: 32 MMOL/L (ref 21–32)
CREAT SERPL-MCNC: 0.73 MG/DL (ref 0.8–1.5)
CRP SERPL-MCNC: 1.3 MG/DL (ref 0–0.9)
D DIMER PPP FEU-MCNC: 0.89 UG/ML(FEU)
DIFFERENTIAL METHOD BLD: ABNORMAL
EOSINOPHIL # BLD: 0 K/UL (ref 0–0.8)
EOSINOPHIL NFR BLD: 0 % (ref 0.5–7.8)
ERYTHROCYTE [DISTWIDTH] IN BLOOD BY AUTOMATED COUNT: 12.6 % (ref 11.9–14.6)
GLOBULIN SER CALC-MCNC: 3.9 G/DL (ref 2.3–3.5)
GLUCOSE SERPL-MCNC: 110 MG/DL (ref 65–100)
HCT VFR BLD AUTO: 46.1 % (ref 41.1–50.3)
HGB BLD-MCNC: 15.2 G/DL (ref 13.6–17.2)
IMM GRANULOCYTES # BLD AUTO: 0 K/UL (ref 0–0.5)
IMM GRANULOCYTES NFR BLD AUTO: 1 % (ref 0–5)
LYMPHOCYTES # BLD: 0.7 K/UL (ref 0.5–4.6)
LYMPHOCYTES NFR BLD: 17 % (ref 13–44)
MAGNESIUM SERPL-MCNC: 2.3 MG/DL (ref 1.8–2.4)
MCH RBC QN AUTO: 31.1 PG (ref 26.1–32.9)
MCHC RBC AUTO-ENTMCNC: 33 G/DL (ref 31.4–35)
MCV RBC AUTO: 94.3 FL (ref 79.6–97.8)
MONOCYTES # BLD: 0.4 K/UL (ref 0.1–1.3)
MONOCYTES NFR BLD: 9 % (ref 4–12)
NEUTS SEG # BLD: 3.1 K/UL (ref 1.7–8.2)
NEUTS SEG NFR BLD: 73 % (ref 43–78)
NRBC # BLD: 0 K/UL (ref 0–0.2)
PLATELET # BLD AUTO: 167 K/UL (ref 150–450)
PLATELET COMMENTS,PCOM: ADEQUATE
PMV BLD AUTO: 10.7 FL (ref 9.4–12.3)
POTASSIUM SERPL-SCNC: 3.5 MMOL/L (ref 3.5–5.1)
PROCALCITONIN SERPL-MCNC: <0.05 NG/ML (ref 0–0.49)
PROT SERPL-MCNC: 6.4 G/DL (ref 6.3–8.2)
RBC # BLD AUTO: 4.89 M/UL (ref 4.23–5.6)
RBC MORPH BLD: ABNORMAL
SODIUM SERPL-SCNC: 138 MMOL/L (ref 138–145)
TROPONIN-HIGH SENSITIVITY: 19.6 PG/ML (ref 0–14)
TROPONIN-HIGH SENSITIVITY: 26.3 PG/ML (ref 0–14)
WBC # BLD AUTO: 4.2 K/UL (ref 4.3–11.1)
WBC MORPH BLD: ABNORMAL

## 2021-11-05 PROCEDURE — 74011250636 HC RX REV CODE- 250/636: Performed by: EMERGENCY MEDICINE

## 2021-11-05 PROCEDURE — 74011250636 HC RX REV CODE- 250/636: Performed by: FAMILY MEDICINE

## 2021-11-05 PROCEDURE — 85025 COMPLETE CBC W/AUTO DIFF WBC: CPT

## 2021-11-05 PROCEDURE — 96374 THER/PROPH/DIAG INJ IV PUSH: CPT

## 2021-11-05 PROCEDURE — 87449 NOS EACH ORGANISM AG IA: CPT

## 2021-11-05 PROCEDURE — 87040 BLOOD CULTURE FOR BACTERIA: CPT

## 2021-11-05 PROCEDURE — 86580 TB INTRADERMAL TEST: CPT | Performed by: FAMILY MEDICINE

## 2021-11-05 PROCEDURE — 86738 MYCOPLASMA ANTIBODY: CPT

## 2021-11-05 PROCEDURE — 84145 PROCALCITONIN (PCT): CPT

## 2021-11-05 PROCEDURE — 93005 ELECTROCARDIOGRAM TRACING: CPT | Performed by: EMERGENCY MEDICINE

## 2021-11-05 PROCEDURE — 74011250637 HC RX REV CODE- 250/637: Performed by: FAMILY MEDICINE

## 2021-11-05 PROCEDURE — 71045 X-RAY EXAM CHEST 1 VIEW: CPT

## 2021-11-05 PROCEDURE — 85379 FIBRIN DEGRADATION QUANT: CPT

## 2021-11-05 PROCEDURE — 84484 ASSAY OF TROPONIN QUANT: CPT

## 2021-11-05 PROCEDURE — 80053 COMPREHEN METABOLIC PANEL: CPT

## 2021-11-05 PROCEDURE — XW0DXM6 INTRODUCTION OF BARICITINIB INTO MOUTH AND PHARYNX, EXTERNAL APPROACH, NEW TECHNOLOGY GROUP 6: ICD-10-PCS | Performed by: FAMILY MEDICINE

## 2021-11-05 PROCEDURE — 87899 AGENT NOS ASSAY W/OPTIC: CPT

## 2021-11-05 PROCEDURE — 74011000250 HC RX REV CODE- 250: Performed by: FAMILY MEDICINE

## 2021-11-05 PROCEDURE — 99285 EMERGENCY DEPT VISIT HI MDM: CPT

## 2021-11-05 PROCEDURE — 87150 DNA/RNA AMPLIFIED PROBE: CPT

## 2021-11-05 PROCEDURE — 87205 SMEAR GRAM STAIN: CPT

## 2021-11-05 PROCEDURE — 83735 ASSAY OF MAGNESIUM: CPT

## 2021-11-05 PROCEDURE — 94762 N-INVAS EAR/PLS OXIMTRY CONT: CPT

## 2021-11-05 PROCEDURE — 36415 COLL VENOUS BLD VENIPUNCTURE: CPT

## 2021-11-05 PROCEDURE — 86140 C-REACTIVE PROTEIN: CPT

## 2021-11-05 PROCEDURE — 94640 AIRWAY INHALATION TREATMENT: CPT

## 2021-11-05 PROCEDURE — 65270000029 HC RM PRIVATE

## 2021-11-05 RX ORDER — FERROUS SULFATE, DRIED 160(50) MG
1 TABLET, EXTENDED RELEASE ORAL
Status: DISCONTINUED | OUTPATIENT
Start: 2021-11-06 | End: 2021-11-08 | Stop reason: HOSPADM

## 2021-11-05 RX ORDER — LANOLIN ALCOHOL/MO/W.PET/CERES
3 CREAM (GRAM) TOPICAL
Status: DISCONTINUED | OUTPATIENT
Start: 2021-11-05 | End: 2021-11-08 | Stop reason: HOSPADM

## 2021-11-05 RX ORDER — ACETAMINOPHEN 650 MG/1
650 SUPPOSITORY RECTAL
Status: DISCONTINUED | OUTPATIENT
Start: 2021-11-05 | End: 2021-11-08 | Stop reason: HOSPADM

## 2021-11-05 RX ORDER — IPRATROPIUM BROMIDE AND ALBUTEROL SULFATE 2.5; .5 MG/3ML; MG/3ML
3 SOLUTION RESPIRATORY (INHALATION)
Status: DISCONTINUED | OUTPATIENT
Start: 2021-11-05 | End: 2021-11-08 | Stop reason: HOSPADM

## 2021-11-05 RX ORDER — SODIUM CHLORIDE 0.9 % (FLUSH) 0.9 %
5-40 SYRINGE (ML) INJECTION AS NEEDED
Status: DISCONTINUED | OUTPATIENT
Start: 2021-11-05 | End: 2021-11-08 | Stop reason: HOSPADM

## 2021-11-05 RX ORDER — DEXAMETHASONE SODIUM PHOSPHATE 100 MG/10ML
6 INJECTION INTRAMUSCULAR; INTRAVENOUS EVERY 24 HOURS
Status: DISCONTINUED | OUTPATIENT
Start: 2021-11-06 | End: 2021-11-08

## 2021-11-05 RX ORDER — SODIUM CHLORIDE 0.9 % (FLUSH) 0.9 %
5-10 SYRINGE (ML) INJECTION AS NEEDED
Status: DISCONTINUED | OUTPATIENT
Start: 2021-11-05 | End: 2021-11-08 | Stop reason: HOSPADM

## 2021-11-05 RX ORDER — DEXAMETHASONE 6 MG/1
6 TABLET ORAL DAILY
Status: ON HOLD | COMMUNITY
End: 2021-11-05

## 2021-11-05 RX ORDER — BENZONATATE 100 MG/1
200 CAPSULE ORAL
Status: DISCONTINUED | OUTPATIENT
Start: 2021-11-05 | End: 2021-11-08 | Stop reason: HOSPADM

## 2021-11-05 RX ORDER — ONDANSETRON 4 MG/1
4 TABLET, ORALLY DISINTEGRATING ORAL
Status: DISCONTINUED | OUTPATIENT
Start: 2021-11-05 | End: 2021-11-08 | Stop reason: HOSPADM

## 2021-11-05 RX ORDER — DEXAMETHASONE 2 MG/1
2 TABLET ORAL DAILY
Status: ON HOLD | COMMUNITY
End: 2021-11-05

## 2021-11-05 RX ORDER — BENZONATATE 200 MG/1
200 CAPSULE ORAL
COMMUNITY

## 2021-11-05 RX ORDER — SODIUM CHLORIDE 0.9 % (FLUSH) 0.9 %
5-10 SYRINGE (ML) INJECTION EVERY 8 HOURS
Status: DISCONTINUED | OUTPATIENT
Start: 2021-11-05 | End: 2021-11-05

## 2021-11-05 RX ORDER — SODIUM CHLORIDE 0.9 % (FLUSH) 0.9 %
5-40 SYRINGE (ML) INJECTION EVERY 8 HOURS
Status: DISCONTINUED | OUTPATIENT
Start: 2021-11-05 | End: 2021-11-08 | Stop reason: HOSPADM

## 2021-11-05 RX ORDER — LEVOTHYROXINE SODIUM 50 UG/1
25 TABLET ORAL
Status: DISCONTINUED | OUTPATIENT
Start: 2021-11-06 | End: 2021-11-08 | Stop reason: HOSPADM

## 2021-11-05 RX ORDER — DEXAMETHASONE SODIUM PHOSPHATE 100 MG/10ML
6 INJECTION INTRAMUSCULAR; INTRAVENOUS
Status: COMPLETED | OUTPATIENT
Start: 2021-11-05 | End: 2021-11-05

## 2021-11-05 RX ORDER — ACETAMINOPHEN 325 MG/1
650 TABLET ORAL
Status: DISCONTINUED | OUTPATIENT
Start: 2021-11-05 | End: 2021-11-08 | Stop reason: HOSPADM

## 2021-11-05 RX ORDER — POLYETHYLENE GLYCOL 3350 17 G/17G
17 POWDER, FOR SOLUTION ORAL DAILY PRN
Status: DISCONTINUED | OUTPATIENT
Start: 2021-11-05 | End: 2021-11-08 | Stop reason: HOSPADM

## 2021-11-05 RX ORDER — ONDANSETRON 2 MG/ML
4 INJECTION INTRAMUSCULAR; INTRAVENOUS
Status: DISCONTINUED | OUTPATIENT
Start: 2021-11-05 | End: 2021-11-08 | Stop reason: HOSPADM

## 2021-11-05 RX ORDER — DEXAMETHASONE 0.5 MG/1
TABLET ORAL DAILY
Status: ON HOLD | COMMUNITY
End: 2021-11-05

## 2021-11-05 RX ORDER — ENOXAPARIN SODIUM 100 MG/ML
30 INJECTION SUBCUTANEOUS EVERY 12 HOURS
Status: DISCONTINUED | OUTPATIENT
Start: 2021-11-05 | End: 2021-11-08 | Stop reason: HOSPADM

## 2021-11-05 RX ORDER — BUDESONIDE AND FORMOTEROL FUMARATE DIHYDRATE 160; 4.5 UG/1; UG/1
2 AEROSOL RESPIRATORY (INHALATION)
COMMUNITY

## 2021-11-05 RX ORDER — BUDESONIDE AND FORMOTEROL FUMARATE DIHYDRATE 160; 4.5 UG/1; UG/1
2 AEROSOL RESPIRATORY (INHALATION)
Status: DISCONTINUED | OUTPATIENT
Start: 2021-11-05 | End: 2021-11-08 | Stop reason: HOSPADM

## 2021-11-05 RX ADMIN — Medication 5 ML: at 21:14

## 2021-11-05 RX ADMIN — ENOXAPARIN SODIUM 30 MG: 100 INJECTION SUBCUTANEOUS at 18:07

## 2021-11-05 RX ADMIN — TUBERCULIN PURIFIED PROTEIN DERIVATIVE 5 UNITS: 5 INJECTION, SOLUTION INTRADERMAL at 14:13

## 2021-11-05 RX ADMIN — Medication 3 MG: at 21:55

## 2021-11-05 RX ADMIN — BARICITINIB 4 MG: 2 TABLET, FILM COATED ORAL at 14:13

## 2021-11-05 RX ADMIN — Medication 10 ML: at 14:14

## 2021-11-05 RX ADMIN — DEXAMETHASONE SODIUM PHOSPHATE 6 MG: 10 INJECTION INTRAMUSCULAR; INTRAVENOUS at 10:37

## 2021-11-05 RX ADMIN — BUDESONIDE AND FORMOTEROL FUMARATE DIHYDRATE 2 PUFF: 160; 4.5 AEROSOL RESPIRATORY (INHALATION) at 20:50

## 2021-11-05 RX ADMIN — SODIUM CHLORIDE 1000 ML: 900 INJECTION, SOLUTION INTRAVENOUS at 10:36

## 2021-11-05 NOTE — ASSESSMENT & PLAN NOTE
Low suspicion for secondary infection; Procal negative; CRP 1.3; Given bolus in ED;  -pna labs  -blood cultures  -consider bacterial infection and antibiotics with changes    11/7: Micro results initially indicated possible MRSA infection with mec A gene in 1 bottle and gram-positive cocci in the other. Was given dose of vancomycin.   This morning results returned coagulase positive suggestive of contamination, will continue to follow cultures and consider redraw with change in status

## 2021-11-05 NOTE — ASSESSMENT & PLAN NOTE
Covid + 10/22, O2 requirement 11/1 (2L)>>4L.   -Dexamethasone, baricitinib, vit D  -Supplemental O2, maintain sat > 92  -Pulmonary hygiene     11/7: Oxygen requirement remains at 4 L, CRP elevated, continue current management

## 2021-11-05 NOTE — ED TRIAGE NOTES
Patient presents via [facility] from home with positive covid hypoxia. Patient was on home 02 6l sat 87%. Patient was placed on EMS o2 and sat 95% on 6L  Patient was diagnosed 11.2 at elder with covid and sent home with home o2. Patient called ems due to feeling weak and hypoxia. VS enroute 130/60  HR 75 97.9 axillary .   Patient denies covid vaccination.      IV lac and 500ml  NS and .25 trebutaline

## 2021-11-05 NOTE — PROGRESS NOTES
Pt is alert and oriented x4, sitting up in bed on 4L NC. RR are even and unlabored, pt in no apparent distress. Pt denies pain at this time. Urinal at bedside. Call light within reach, safety measures in place. Pt instructed to call for assistance if needed. Will continue to monitor.

## 2021-11-05 NOTE — PROGRESS NOTES
TRANSFER - IN REPORT:    Verbal report received from Prosper Oviedo RN(name) on [facility]  being received from ER(unit) for routine progression of care      Report consisted of patients Situation, Background, Assessment and   Recommendations(SBAR). Information from the following report(s) SBAR, Kardex, [facility] and Recent Results was reviewed with the receiving nurse. Opportunity for questions and clarification was provided. Report given to Magi Madera RN, who will assume primary care on arrival to floor.

## 2021-11-05 NOTE — PROGRESS NOTES
Patient COVID+. RNCM called room 802; no answer. Called patient's spouse, Vinay Alonso at [phone] to discuss planning. Patient and spouse lives independently in own one level residence with approx seven steps to enter. Patient was recently admitted (11/01) and discharged (11/02) from [address] [place] [address] with home O2. Patient required [facility] with ambulation. [facility] is home O2 provider. PCP and demographic information confirmed. Patient uses [id number] at "[employer] At discharge, would like to resume [facility]. PT/OT evals to be ordered. CM will continue to follow for discharge needs. Care Management Interventions  PCP Verified by CM:  Yes (Dr Debby Sánchez @ [phone])  Mode of Transport at Discharge: Self  Transition of Care Consult (CM Consult): Home Health, Discharge Planning (current with [facility], would like to resume.)  [facility] - INPATIENT: No  Reason Outside [place]: Patient already serviced by other home care/hospice agency ([address] [place] [address])  Health Maintenance Reviewed: Yes  Physical Therapy Consult: Yes (ordered 11/05)  Occupational Therapy Consult: Yes (ordered 11/05)  Speech Therapy Consult: No  Support Systems: Other Family Member(s), Spouse/Significant Other  Confirm Follow Up Transport: Family  The Plan for Transition of Care is Related to the Following Treatment Goals : return to a safe level of independence  The Patient and/or Patient Representative was Provided with a Choice of Provider and Agrees with the Discharge Plan?: Yes  Freedom of Choice List was Provided with Basic Dialogue that Supports the Patient's Individualized Plan of Care/Goals, Treatment Preferences and Shares the Quality Data Associated with the Providers?: Yes   Resource Information Provided?: No  Discharge Location  Discharge Placement: Home with home health

## 2021-11-05 NOTE — PROGRESS NOTES
Patient arrived to floor via stretcher from ED. Patient able to ambulate to bed from stretcher. Oxygen placed to 4 L NC. Respirations even/unlabored. BLS diminished at bases. No acute distress. Dual skin assessment completed with ILEANA Deleon. No pressure ulcers noted. Water given. Bed left in low/locked position with SR up x 2. Phone and call light within reach.

## 2021-11-05 NOTE — ACP (ADVANCE CARE PLANNING)
[facility] Hospitalist [facility]  At the heart of better care     Advance Care Planning   Admit Date:  2021  9:06 AM   Name:  Sammy Bermeo   Age:  65 y.o. Sex:  male  :  1950   MRN:  [id number]   Room:  802/    Sammy Bermeo is able to make his own decisions: Yes    POA/surrogate decision maker if patient is altered:  Wife, Carlie Pea    Other members present in the meeting:   None    Patient / surrogate decision-maker directed:  Code Status: FULL CODE -full aggressive medical and surgical interventions, including intubations, resuscitations, pressors, artificial tube feeding    Other ACP topics discussed, if applicable:   Life-threatening nature of COVID-19 and unvaccinated 68-year-old and poor prognosis of intubation results. Patient or surrogate consented to discussion of the current conditions, workup, management plans, prognosis, and understand the risk for further deterioration. Time spent: 33 minutes in direct discussion (face to face and/or over phone).     Signed:  Tia Chris MD

## 2021-11-05 NOTE — ED NOTES
TRANSFER - OUT REPORT:    Verbal report given to Janet(name) on [facility]  being transferred to [facility](unit) for routine progression of care       Report consisted of patients Situation, Background, Assessment and   Recommendations(SBAR). Information from the following report(s) SBAR, ED Summary, Procedure Summary, MAR and Recent Results was reviewed with the receiving nurse. Lines:   Peripheral IV 11/05/21 Left Antecubital (Active)        Opportunity for questions and clarification was provided.       Patient transported with:   O2 @ 4 liters  Tech

## 2021-11-05 NOTE — H&P
Hospitalist History and Physical   Admit Date:  2021  9:06 AM   Name:  Renita Marquez   Age:  65 y.o. Sex:  male  :  1950   MRN:  [id number]   Room:  [facility]/    Presenting Complaint: Positive For Covid-19    Reason(s) for Admission: Acute hypoxemic respiratory failure due to COVID-19 ([facility] [U07.1, J96.01]  Sepsis due to COVID-19 ([facility] [U07.1, A41.89]     History of Present Illness:   Renita Marquez is a 65 y.o. male with medical history of ocular melanoma, coronary artery disease s/p CABG, hypertension who presented with acute respiratory failure secondary to COVID-19. His onset of symptoms was 10/22. He took ivermectin as an outpatient. He has not had the Covid vaccine. He was admitted to [facility] from  to  after leaving [address] He was given home steroids and antibiotics (he thinks just steroids). He did not receive tocilizumab. His symptoms continued to worsen to where he was satting in the 80s on 6 L. His family insisted on his return to the hospital where he was found to be acutely hypoxic and remaining Covid positive. As such he was admitted to [address] [place] [address] on . He reports acute shortness of breath and cough. He denies any secondary symptoms including headache, nausea, vomiting, peripheral edema, changes in urine or changes in stool. He does not have a recent history of cancer. He has no history of blood clots. Review of Systems:  10 systems reviewed and negative except as noted in HPI. Assessment & Plan:   Acute hypoxemic respiratory failure due to COVID-19 ([facility]  Covid + 10/22, O2 requirement  (2L)>>4L.   -Dexamethasone, baricitinib, vit D  -Supplemental O2, maintain sat > 92  -Inhaled medications  -Pulmonary hygiene       Sepsis due to COVID-19 [facility])  Low suspicion for secondary infection; Procal negative; CRP 1.3; Given bolus in ED;  -pna labs  -blood cultures  -consider bacterial infection and antibiotics with changes      COVID-19 vaccine first dose not administered  -recommend adherence to CDC guidelines    Hypothyroidism  -levothyroxine        Dispo/Discharge Planning:   Pending clinical improvement    Diet: ADULT DIET Regular  ADULT ORAL NUTRITION SUPPLEMENT Breakfast, Lunch, Dinner; Standard High Calorie/High Protein  VTE ppx: enoxaparin  Code status: Full Code    Hospital Problems as of 11/5/2021 Date Reviewed: 11/5/2021          Codes Class Noted - Resolved POA    Acute hypoxemic respiratory failure due to COVID-19 [facility]) ICD-10-CM: U07.1, J96.01  ICD-9-CM: 518.81, 079.89, 799.02  11/5/2021 - Present Yes        Sepsis due to COVID-19 [facility]) ICD-10-CM: U07.1, A41.89  ICD-9-CM: 079.89, 995.91  11/5/2021 - Present Yes        COVID-19 vaccine first dose not administered ICD-10-CM: Z28.9  ICD-9-CM: V64.00  11/5/2021 - Present Yes        Body mass index (BMI) of 26.0 to 26.9 in adult ICD-10-CM: Z68.26  ICD-9-CM: V85.22  11/5/2021 - Present Yes    Overview Signed 11/5/2021  1:21 PM by Jovany Cifuentes MD     - healthy lifestyle at discharge             Elevated transaminase level ICD-10-CM: R74.01  ICD-9-CM: 790.4  11/5/2021 - Present Yes        Hypothyroidism (Chronic) ICD-10-CM: E03.9  ICD-9-CM: 244.9  11/1/2021 - Present Yes              Past History:  Past Medical History:   Diagnosis Date    Bradycardia     CAD (coronary artery disease)     2 stents placed in 2007 in [place] ; now followed by [facility]     Essential hypertension     no meds     Hemochromatosis     not followed by Hematology     Hyperlipidemia     no meds     Ill-defined condition     has prosthetic left eye     Lower back pain     Melanoma ([address] 1986    left eye; eye was removed     OA (osteoarthritis)     Thyroid disease     Hypo=on meds    Vasovagal syncope 2014    while starting an IV for a colonoscopy      Past Surgical History:   Procedure Laterality Date    HX CORONARY STENT PLACEMENT  2007    2 stents     HX HEENT Left 1986    left eye removed due to melanoma ; has prosthetic left eye     HX ORTHOPAEDIC Right 1975    ligament repair right ankle     HX SHOULDER ARTHROSCOPY Right 1984      No Known Allergies   Social History     Tobacco Use    Smoking status: Never Smoker    Smokeless tobacco: Never Used   Substance Use Topics    Alcohol use: Never      Family History   Problem Relation Age of Onset    Coronary Art Dis Father     Cancer Father     Deep Vein Thrombosis Father     Emphysema Mother       Family history reviewed and negative except as otherwise noted. There is no immunization history for the selected administration types on file for this patient. Prior to Admit Medications:  Current Outpatient Medications   Medication Instructions    acetaminophen (TYLENOL) 500 mg, Oral, EVERY 6 HOURS AS NEEDED    anastrozole (ARIMIDEX) 0.5 mg, Oral, EVERY FRIDAY    b complex vitamins tablet 1 Tablet, Oral, DAILY    benzonatate (TESSALON) 200 mg, Oral, 3 TIMES DAILY AS NEEDED    budesonide-formoteroL (SYMBICORT) 160-4.5 mcg/actuation HFAA 2 Puffs, Inhalation    cholecalciferol (VITAMIN D3) 5,000 Units, Oral, DAILY    finasteride (PROSCAR) 5 mg, Oral, DAILY, On Monday, Wednesday, Friday.     levothyroxine (SYNTHROID) 25 mcg, Oral, DAILY BEFORE BREAKFAST    magnesium oxide (MAG-OX) 400 mg, Oral, DAILY    melatonin 3 mg, Oral, EVERY BEDTIME    NATURE-THROID 48.75 mg tab TAKE 1 TABLET BY MOUTH ONCE DAILY IN THE MORNING    OTHER 0.5 mL, IntraMUSCular, Testosterone injection every 2 weeks    OTHER 1,000 mg, Oral, DAILY, Folate       Objective:     Patient Vitals for the past 24 hrs:   Temp Pulse Resp BP SpO2   11/05/21 1157 97.9 °F (36.6 °C) 62 20 (!) 151/82 92 %   11/05/21 1122  75 22  95 %   11/05/21 1114  74 22  95 %   11/05/21 1102  75 22 (!) 140/75    11/05/21 1033  80 25 (!) 146/75 94 %   11/05/21 1011  83 14  94 %   11/05/21 1002  78  (!) 145/67    11/05/21 0933  83 29 (!) 142/67 92 % 11/05/21 0918     94 %   11/05/21 0913 97.8 °F (36.6 °C)       11/05/21 0910  87 18 (!) 158/75 96 %     Oxygen Therapy  O2 Sat (%): 92 % (11/05/21 1157)  Pulse via Oximetry: 71 beats per minute (11/05/21 1122)  O2 Device: Nasal cannula (11/05/21 1033)  O2 Flow Rate (L/min): 4 l/min (11/05/21 1033)    Estimated body mass index is 26.45 kg/m² as calculated from the following:    Height as of 6/28/21: 6' (1.829 m). Weight as of this encounter: 88.5 kg (195 lb). No intake or output data in the 24 hours ending 11/05/21 1324        Blood pressure (!) 151/82, pulse 62, temperature 97.9 °F (36.6 °C), resp. rate 20, weight 88.5 kg (195 lb), SpO2 92 %. Physical Exam  Vitals and nursing note reviewed. Constitutional:       General: He is not in acute distress. Appearance: Normal appearance. HENT:      Head: Normocephalic. Eyes:      Extraocular Movements: Extraocular movements intact. Comments: L enucleation with prosthesis   Cardiovascular:      Rate and Rhythm: Normal rate. Pulses:           Radial pulses are 2+ on the left side. Heart sounds: No murmur heard. Pulmonary:      Effort: Pulmonary effort is normal. No respiratory distress. Breath sounds: Examination of the right-lower field reveals rhonchi. Examination of the left-lower field reveals rhonchi. Wheezing and rhonchi present. Abdominal:      General: Bowel sounds are normal.      Tenderness: There is no abdominal tenderness. Musculoskeletal:         General: No deformity. Cervical back: No rigidity. Right lower leg: No edema. Left lower leg: No edema. Skin:     General: Skin is warm and dry. Neurological:      General: No focal deficit present. Mental Status: He is alert and oriented to person, place, and time.    Psychiatric:         Mood and Affect: Mood normal.         Behavior: Behavior normal.         I have reviewed ordered lab tests and independently visualized imaging below:    Last 24hr Labs:  Recent Results (from the past 24 hour(s))   CBC WITH AUTOMATED DIFF    Collection Time: 11/05/21  9:16 AM   Result Value Ref Range    WBC 4.2 (L) 4.3 - 11.1 K/uL    RBC 4.89 4.23 - 5.6 M/uL    HGB 15.2 13.6 - 17.2 g/dL    HCT 46.1 41.1 - 50.3 %    MCV 94.3 79.6 - 97.8 FL    MCH 31.1 26.1 - 32.9 PG    MCHC 33.0 31.4 - 35.0 g/dL    RDW 12.6 11.9 - 14.6 %    PLATELET 032 133 - 618 K/uL    MPV 10.7 9.4 - 12.3 FL    ABSOLUTE NRBC 0.00 0.0 - 0.2 K/uL    NEUTROPHILS 73 43 - 78 %    LYMPHOCYTES 17 13 - 44 %    MONOCYTES 9 4.0 - 12.0 %    EOSINOPHILS 0 (L) 0.5 - 7.8 %    BASOPHILS 0 0.0 - 2.0 %    IMMATURE GRANULOCYTES 1 0.0 - 5.0 %    ABS. NEUTROPHILS 3.1 1.7 - 8.2 K/UL    ABS. LYMPHOCYTES 0.7 0.5 - 4.6 K/UL    ABS. MONOCYTES 0.4 0.1 - 1.3 K/UL    ABS. EOSINOPHILS 0.0 0.0 - 0.8 K/UL    ABS. BASOPHILS 0.0 0.0 - 0.2 K/UL    ABS. IMM. GRANS. 0.0 0.0 - 0.5 K/UL    RBC COMMENTS NORMOCYTIC/NORMOCHROMIC      WBC COMMENTS Result Confirmed By Smear      PLATELET COMMENTS ADEQUATE      DF AUTOMATED     METABOLIC PANEL, COMPREHENSIVE    Collection Time: 11/05/21  9:16 AM   Result Value Ref Range    Sodium 138 138 - 145 mmol/L    Potassium 3.5 3.5 - 5.1 mmol/L    Chloride 102 98 - 107 mmol/L    CO2 32 21 - 32 mmol/L    Anion gap 4 (L) 7 - 16 mmol/L    Glucose 110 (H) 65 - 100 mg/dL    BUN 13 8 - 23 MG/DL    Creatinine 0.73 (L) 0.8 - 1.5 MG/DL    GFR est AA >60 >60 ml/min/1.73m2    GFR est non-AA >60 >60 ml/min/1.73m2    Calcium 8.4 8.3 - 10.4 MG/DL    Bilirubin, total 1.0 0.2 - 1.1 MG/DL    ALT (SGPT) 80 (H) 12 - 65 U/L    AST (SGOT) 71 (H) 15 - 37 U/L    Alk.  phosphatase 58 50 - 136 U/L    Protein, total 6.4 6.3 - 8.2 g/dL    Albumin 2.5 (L) 3.2 - 4.6 g/dL    Globulin 3.9 (H) 2.3 - 3.5 g/dL    A-G Ratio 0.6 (L) 1.2 - 3.5     MAGNESIUM    Collection Time: 11/05/21  9:16 AM   Result Value Ref Range    Magnesium 2.3 1.8 - 2.4 mg/dL   PROCALCITONIN    Collection Time: 11/05/21  9:16 AM   Result Value Ref Range Procalcitonin <0.05 0.00 - 0.49 ng/mL   C REACTIVE PROTEIN, QT    Collection Time: 11/05/21  9:16 AM   Result Value Ref Range    C-Reactive protein 1.3 (H) 0.0 - 0.9 mg/dL   D DIMER    Collection Time: 11/05/21  9:16 AM   Result Value Ref Range    D DIMER 0.89 (H) <0.56 ug/ml(FEU)   TROPONIN-HIGH SENSITIVITY    Collection Time: 11/05/21  9:16 AM   Result Value Ref Range    Troponin-High Sensitivity 19.6 (H) 0 - 14 pg/mL       All Micro Results     Procedure Component Value Units Date/Time    CULTURE, BLOOD #2 [[id number]]     Order Status: Sent Specimen: Blood     LEGIONELLA PNEUMOPHILA AG, URINE [[id number]]     Order Status: Sent Specimen: Urine     S. Walker Roderick, UR/CSF [[id number]]     Order Status: Sent     CULTURE, BLOOD #1 [[id number]]     Order Status: Sent Specimen: Blood           Other Studies:  XR CHEST PORT    Result Date: 11/5/2021  Portable chest x-ray CLINICAL INDICATION: Hypoxia, Covid 19 positive FINDINGS: Single AP view the chest submitted without comparison shows bilateral peripheral airspace opacities in the midlungs and at the lung bases. There is no pleural effusion. The cardiac silhouette and mediastinum are unremarkable. There is no pneumothorax. Bilateral atypical pneumonia.       Medications Administered     dexamethasone (DECADRON) 10 mg/mL injection 6 mg     Admin Date  11/05/2021 Action  Given Dose  6 mg Route  IntraVENous Administered By  Sly Leal RN          sodium chloride 0.9 % bolus infusion 1,000 mL     Admin Date  11/05/2021 Action  New Bag Dose  1,000 mL Rate  1,000 mL/hr Route  IntraVENous Administered By  Sly Leal RN              Signed:  Kev Perez MD

## 2021-11-06 LAB
ANION GAP SERPL CALC-SCNC: 5 MMOL/L (ref 7–16)
BUN SERPL-MCNC: 16 MG/DL (ref 8–23)
CALCIUM SERPL-MCNC: 8.4 MG/DL (ref 8.3–10.4)
CHLORIDE SERPL-SCNC: 105 MMOL/L (ref 98–107)
CO2 SERPL-SCNC: 28 MMOL/L (ref 21–32)
CREAT SERPL-MCNC: 0.72 MG/DL (ref 0.8–1.5)
CRP SERPL-MCNC: 1.1 MG/DL (ref 0–0.9)
ERYTHROCYTE [DISTWIDTH] IN BLOOD BY AUTOMATED COUNT: 12.6 % (ref 11.9–14.6)
GLUCOSE SERPL-MCNC: 101 MG/DL (ref 65–100)
HCT VFR BLD AUTO: 42.6 % (ref 41.1–50.3)
HGB BLD-MCNC: 14.2 G/DL (ref 13.6–17.2)
MAGNESIUM SERPL-MCNC: 2.2 MG/DL (ref 1.8–2.4)
MCH RBC QN AUTO: 30.5 PG (ref 26.1–32.9)
MCHC RBC AUTO-ENTMCNC: 33.3 G/DL (ref 31.4–35)
MCV RBC AUTO: 91.6 FL (ref 79.6–97.8)
MM INDURATION POC: 0 MM (ref 0–5)
NRBC # BLD: 0 K/UL (ref 0–0.2)
PLATELET # BLD AUTO: 212 K/UL (ref 150–450)
PMV BLD AUTO: 10.9 FL (ref 9.4–12.3)
POTASSIUM SERPL-SCNC: 3.9 MMOL/L (ref 3.5–5.1)
PPD POC: NEGATIVE NEGATIVE
RBC # BLD AUTO: 4.65 M/UL (ref 4.23–5.6)
SODIUM SERPL-SCNC: 138 MMOL/L (ref 138–145)
WBC # BLD AUTO: 5.1 K/UL (ref 4.3–11.1)

## 2021-11-06 PROCEDURE — 36415 COLL VENOUS BLD VENIPUNCTURE: CPT

## 2021-11-06 PROCEDURE — 83735 ASSAY OF MAGNESIUM: CPT

## 2021-11-06 PROCEDURE — 85027 COMPLETE CBC AUTOMATED: CPT

## 2021-11-06 PROCEDURE — 97530 THERAPEUTIC ACTIVITIES: CPT

## 2021-11-06 PROCEDURE — 74011250637 HC RX REV CODE- 250/637: Performed by: FAMILY MEDICINE

## 2021-11-06 PROCEDURE — 74011250636 HC RX REV CODE- 250/636: Performed by: FAMILY MEDICINE

## 2021-11-06 PROCEDURE — 65270000029 HC RM PRIVATE

## 2021-11-06 PROCEDURE — 80048 BASIC METABOLIC PNL TOTAL CA: CPT

## 2021-11-06 PROCEDURE — 94760 N-INVAS EAR/PLS OXIMETRY 1: CPT

## 2021-11-06 PROCEDURE — 97165 OT EVAL LOW COMPLEX 30 MIN: CPT

## 2021-11-06 PROCEDURE — 97535 SELF CARE MNGMENT TRAINING: CPT

## 2021-11-06 PROCEDURE — 94640 AIRWAY INHALATION TREATMENT: CPT

## 2021-11-06 PROCEDURE — 97161 PT EVAL LOW COMPLEX 20 MIN: CPT

## 2021-11-06 PROCEDURE — 77010033678 HC OXYGEN DAILY

## 2021-11-06 PROCEDURE — 86140 C-REACTIVE PROTEIN: CPT

## 2021-11-06 RX ORDER — VANCOMYCIN 2 GRAM/500 ML IN 0.9 % SODIUM CHLORIDE INTRAVENOUS
2000 ONCE
Status: COMPLETED | OUTPATIENT
Start: 2021-11-06 | End: 2021-11-07

## 2021-11-06 RX ADMIN — VANCOMYCIN HYDROCHLORIDE 2000 MG: 10 INJECTION, POWDER, LYOPHILIZED, FOR SOLUTION INTRAVENOUS at 19:35

## 2021-11-06 RX ADMIN — BARICITINIB 4 MG: 2 TABLET, FILM COATED ORAL at 13:49

## 2021-11-06 RX ADMIN — Medication 5 ML: at 13:50

## 2021-11-06 RX ADMIN — BUDESONIDE AND FORMOTEROL FUMARATE DIHYDRATE 2 PUFF: 160; 4.5 AEROSOL RESPIRATORY (INHALATION) at 20:05

## 2021-11-06 RX ADMIN — ENOXAPARIN SODIUM 30 MG: 100 INJECTION SUBCUTANEOUS at 06:09

## 2021-11-06 RX ADMIN — ENOXAPARIN SODIUM 30 MG: 100 INJECTION SUBCUTANEOUS at 17:15

## 2021-11-06 RX ADMIN — Medication 10 ML: at 21:39

## 2021-11-06 RX ADMIN — Medication 1 TABLET: at 08:18

## 2021-11-06 RX ADMIN — LEVOTHYROXINE SODIUM 25 MCG: 0.05 TABLET ORAL at 06:09

## 2021-11-06 RX ADMIN — BUDESONIDE AND FORMOTEROL FUMARATE DIHYDRATE 2 PUFF: 160; 4.5 AEROSOL RESPIRATORY (INHALATION) at 08:20

## 2021-11-06 RX ADMIN — DEXAMETHASONE SODIUM PHOSPHATE 6 MG: 10 INJECTION INTRAMUSCULAR; INTRAVENOUS at 08:18

## 2021-11-06 RX ADMIN — Medication 5 ML: at 05:01

## 2021-11-06 NOTE — PROGRESS NOTES
Sitting on side of bed, alert, oriented. Oxygen on via n/c at 4 lpm, lung sounds diminished. Non productive cough noted. Heart regular. Abdomen soft, bs present. Voiding w/o any difficulty denies any pain, IS at bedside. INT left arm intact, site healthy. . Aware to call for asst, rails up x3. Labs resulted and reviewed.

## 2021-11-06 NOTE — PROGRESS NOTES
Received call from lab, blood culture positive, gram + cocci, perfect serve message to Dr Chelo Hernandez

## 2021-11-06 NOTE — PROGRESS NOTES
Hospitalist Progress Note   Admit Date:  2021  9:06 AM   Name:  Amarilis Reynoso   Age:  65 y.o. Sex:  male  :  1950   MRN:  [id number]   Room:  [facility]/    Presenting Complaint: Positive For Covid-19    Reason(s) for Admission: Acute hypoxemic respiratory failure due to [facility]19 ([facility] [U07.1, J96.01]  Sepsis due to COVID-19 [facility]) [U07.1, A41.89]     Hospital Course & Interval History:   70M PMhx ocular melanoma, coronary artery disease s/p CABG, hypertension admitted  with acute respiratory failure secondary to COVID-19. His onset of symptoms was 10/22. He took ivermectin as an outpatient. He had not had the Covid vaccine. He was admitted to [facility] from  to  after leaving [address] He was given home steroids and antibiotics (he thinks just steroids). He did not receive tocilizumab. His symptoms continued to worsen to where he was satting in the 80s on 6 L. His family insisted on his return to the hospital where he was found to be acutely hypoxic and remaining Covid positive. He reported acute shortness of breath and cough. He denied any secondary symptoms including headache, nausea, vomiting, peripheral edema, changes in urine or changes in stool. He did not have a recent history of cancer. He had no history of blood clots. Subjective (21):  Continues to experience dyspnea, though maintaining his oxygen saturation on 4 L. He has no other physical complaints today. Assessment & Plan:   * Acute hypoxemic respiratory failure due to COVID-19 ([facility]  Covid + 10/22, O2 requirement  (2L)>>4L.   -Dexamethasone, baricitinib, vit D  -Supplemental O2, maintain sat > 92  -Pulmonary hygiene     : Oxygen requirement remains at 4 L, CRP downtrending, continue current management    Sepsis due to COVID-19 [facility])  Low suspicion for secondary infection; Procal negative; CRP 1.3; Given bolus in ED;  -pna labs  -blood cultures  -consider bacterial infection and antibiotics with changes    11/5: Micro results remain consistent with solitary Covid infection, continue current management, follow cultures      Body mass index (BMI) of 26.0 to 26.9 in adult  - healthy lifestyle at discharge    COVID-19 vaccine first dose not administered  -recommend adherence to CDC guidelines    Hypothyroidism  -levothyroxine        Dispo/Discharge Planning:      Home in 1-2d    Diet:  ADULT ORAL NUTRITION SUPPLEMENT Breakfast, Lunch, Dinner; Standard High Calorie/High Protein  ADULT DIET Regular; doesn't like white milk, please send chocolate milk in place.   DVT PPx: enoxaparin  Code status: Full Code    Hospital Problems as of 11/6/2021 Date Reviewed: 11/5/2021          Codes Class Noted - Resolved POA    * (Principal) Acute hypoxemic respiratory failure due to COVID-19 [facility]) ICD-10-CM: U07.1, J96.01  ICD-9-CM: 518.81, 079.89, 799.02  11/5/2021 - Present Yes        Sepsis due to COVID-19 [facility]) ICD-10-CM: U07.1, A41.89  ICD-9-CM: 079.89, 995.91  11/5/2021 - Present Yes        COVID-19 vaccine first dose not administered ICD-10-CM: Z28.9  ICD-9-CM: V64.00  11/5/2021 - Present Yes        Body mass index (BMI) of 26.0 to 26.9 in adult ICD-10-CM: Z68.26  ICD-9-CM: V85.22  11/5/2021 - Present Yes        Elevated transaminase level ICD-10-CM: R74.01  ICD-9-CM: 790.4  11/5/2021 - Present Yes        Hypothyroidism (Chronic) ICD-10-CM: E03.9  ICD-9-CM: 244.9  11/1/2021 - Present Yes              Objective:     Patient Vitals for the past 24 hrs:   Temp Pulse Resp BP SpO2   11/06/21 1140 98.4 °F (36.9 °C) 70 20 (!) 144/86 91 %   11/06/21 0820     94 %   11/06/21 0750 98.1 °F (36.7 °C) 61 20 (!) 149/85 90 %   11/06/21 0336 98.8 °F (37.1 °C) 70 20 (!) 148/88 96 %   11/05/21 2328 98.8 °F (37.1 °C) 63 22 (!) 144/81 90 %   11/05/21 2054     94 %   11/05/21 1920 98 °F (36.7 °C) 73 24 (!) 155/80 92 %   11/05/21 1444 97.8 °F (36.6 °C) 63 20 (!) 144/79 93 %     Oxygen Therapy  O2 Sat (%): 91 % (11/06/21 1140)  Pulse via Oximetry: 52 beats per minute (11/06/21 0820)  O2 Device: Nasal cannula (11/06/21 0820)  Skin Assessment: Clean, dry, & intact (11/06/21 0441)  Skin Protection for O2 Device: No (11/06/21 0441)  O2 Flow Rate (L/min): 4 l/min (11/06/21 0820)    Estimated body mass index is 26.65 kg/m² as calculated from the following:    Height as of 6/28/21: 6' (1.829 m). Weight as of this encounter: 89.1 kg (196 lb 8 oz). Intake/Output Summary (Last 24 hours) at 11/6/2021 1312  Last data filed at 11/6/2021 0336  Gross per 24 hour   Intake 1175 ml   Output    Net 1175 ml       Blood pressure (!) 144/86, pulse 70, temperature 98.4 °F (36.9 °C), resp. rate 20, weight 89.1 kg (196 lb 8 oz), SpO2 91 %. Physical Exam  Vitals and nursing note reviewed. Constitutional:       General: He is not in acute distress. Appearance: Normal appearance. HENT:      Head: Normocephalic. Eyes:      Extraocular Movements: Extraocular movements intact. Comments: L enucleation with prosthesis   Cardiovascular:      Rate and Rhythm: Normal rate. Pulses:           Radial pulses are 2+ on the left side. Heart sounds: Normal heart sounds. Pulmonary:      Effort: Pulmonary effort is normal. No respiratory distress. Breath sounds: Wheezing present. No rhonchi. Abdominal:      General: Bowel sounds are normal.      Tenderness: There is no abdominal tenderness. Musculoskeletal:         General: No deformity. Cervical back: No rigidity. Right lower leg: No edema. Left lower leg: No edema. Skin:     General: Skin is warm and dry. Neurological:      General: No focal deficit present. Mental Status: He is alert and oriented to person, place, and time.    Psychiatric:         Mood and Affect: Mood normal.         Behavior: Behavior normal.         I have reviewed ordered lab tests and independently visualized imaging below:    Recent Labs:  Recent Results (from the past 48 hour(s))   CBC WITH AUTOMATED DIFF    Collection Time: 11/05/21  9:16 AM   Result Value Ref Range    WBC 4.2 (L) 4.3 - 11.1 K/uL    RBC 4.89 4.23 - 5.6 M/uL    HGB 15.2 13.6 - 17.2 g/dL    HCT 46.1 41.1 - 50.3 %    MCV 94.3 79.6 - 97.8 FL    MCH 31.1 26.1 - 32.9 PG    MCHC 33.0 31.4 - 35.0 g/dL    RDW 12.6 11.9 - 14.6 %    PLATELET 558 699 - 438 K/uL    MPV 10.7 9.4 - 12.3 FL    ABSOLUTE NRBC 0.00 0.0 - 0.2 K/uL    NEUTROPHILS 73 43 - 78 %    LYMPHOCYTES 17 13 - 44 %    MONOCYTES 9 4.0 - 12.0 %    EOSINOPHILS 0 (L) 0.5 - 7.8 %    BASOPHILS 0 0.0 - 2.0 %    IMMATURE GRANULOCYTES 1 0.0 - 5.0 %    ABS. NEUTROPHILS 3.1 1.7 - 8.2 K/UL    ABS. LYMPHOCYTES 0.7 0.5 - 4.6 K/UL    ABS. MONOCYTES 0.4 0.1 - 1.3 K/UL    ABS. EOSINOPHILS 0.0 0.0 - 0.8 K/UL    ABS. BASOPHILS 0.0 0.0 - 0.2 K/UL    ABS. IMM. GRANS. 0.0 0.0 - 0.5 K/UL    RBC COMMENTS NORMOCYTIC/NORMOCHROMIC      WBC COMMENTS Result Confirmed By Smear      PLATELET COMMENTS ADEQUATE      DF AUTOMATED     METABOLIC PANEL, COMPREHENSIVE    Collection Time: 11/05/21  9:16 AM   Result Value Ref Range    Sodium 138 138 - 145 mmol/L    Potassium 3.5 3.5 - 5.1 mmol/L    Chloride 102 98 - 107 mmol/L    CO2 32 21 - 32 mmol/L    Anion gap 4 (L) 7 - 16 mmol/L    Glucose 110 (H) 65 - 100 mg/dL    BUN 13 8 - 23 MG/DL    Creatinine 0.73 (L) 0.8 - 1.5 MG/DL    GFR est AA >60 >60 ml/min/1.73m2    GFR est non-AA >60 >60 ml/min/1.73m2    Calcium 8.4 8.3 - 10.4 MG/DL    Bilirubin, total 1.0 0.2 - 1.1 MG/DL    ALT (SGPT) 80 (H) 12 - 65 U/L    AST (SGOT) 71 (H) 15 - 37 U/L    Alk.  phosphatase 58 50 - 136 U/L    Protein, total 6.4 6.3 - 8.2 g/dL    Albumin 2.5 (L) 3.2 - 4.6 g/dL    Globulin 3.9 (H) 2.3 - 3.5 g/dL    A-G Ratio 0.6 (L) 1.2 - 3.5     MAGNESIUM    Collection Time: 11/05/21  9:16 AM   Result Value Ref Range    Magnesium 2.3 1.8 - 2.4 mg/dL   PROCALCITONIN    Collection Time: 11/05/21  9:16 AM   Result Value Ref Range    Procalcitonin <0.05 0.00 - 0.49 ng/mL   C REACTIVE PROTEIN, QT    Collection Time: 11/05/21  9:16 AM   Result Value Ref Range    C-Reactive protein 1.3 (H) 0.0 - 0.9 mg/dL   D DIMER    Collection Time: 11/05/21  9:16 AM   Result Value Ref Range    D DIMER 0.89 (H) <0.56 ug/ml(FEU)   TROPONIN-HIGH SENSITIVITY    Collection Time: 11/05/21  9:16 AM   Result Value Ref Range    Troponin-High Sensitivity 19.6 (H) 0 - 14 pg/mL   CULTURE, BLOOD    Collection Time: 11/05/21 12:56 PM    Specimen: Blood   Result Value Ref Range    Special Requests: RIGHT  Antecubital        Culture result: NO GROWTH AFTER 16 HOURS     CULTURE, BLOOD    Collection Time: 11/05/21 12:58 PM    Specimen: Blood   Result Value Ref Range    Special Requests: LEFT  HAND        Culture result: NO GROWTH AFTER 16 HOURS     TROPONIN-HIGH SENSITIVITY    Collection Time: 11/05/21  2:41 PM   Result Value Ref Range    Troponin-High Sensitivity 26.3 (H) 0 - 14 pg/mL   C REACTIVE PROTEIN, QT    Collection Time: 11/06/21  5:51 AM   Result Value Ref Range    C-Reactive protein 1.1 (H) 0.0 - 0.9 mg/dL   CBC W/O DIFF    Collection Time: 11/06/21  5:51 AM   Result Value Ref Range    WBC 5.1 4.3 - 11.1 K/uL    RBC 4.65 4.23 - 5.6 M/uL    HGB 14.2 13.6 - 17.2 g/dL    HCT 42.6 41.1 - 50.3 %    MCV 91.6 79.6 - 97.8 FL    MCH 30.5 26.1 - 32.9 PG    MCHC 33.3 31.4 - 35.0 g/dL    RDW 12.6 11.9 - 14.6 %    PLATELET 116 876 - 710 K/uL    MPV 10.9 9.4 - 12.3 FL    ABSOLUTE NRBC 0.00 0.0 - 0.2 K/uL   METABOLIC PANEL, BASIC    Collection Time: 11/06/21  5:51 AM   Result Value Ref Range    Sodium 138 138 - 145 mmol/L    Potassium 3.9 3.5 - 5.1 mmol/L    Chloride 105 98 - 107 mmol/L    CO2 28 21 - 32 mmol/L    Anion gap 5 (L) 7 - 16 mmol/L    Glucose 101 (H) 65 - 100 mg/dL    BUN 16 8 - 23 MG/DL    Creatinine 0.72 (L) 0.8 - 1.5 MG/DL    GFR est AA >60 >60 ml/min/1.73m2    GFR est non-AA >60 >60 ml/min/1.73m2    Calcium 8.4 8.3 - 10.4 MG/DL   MAGNESIUM    Collection Time: 11/06/21  5:51 AM   Result Value Ref Range    Magnesium 2.2 1.8 - 2.4 mg/dL       All Micro Results     Procedure Component Value Units Date/Time    CULTURE, BLOOD #1 [[id number]] Collected: 11/05/21 1256    Order Status: Completed Specimen: Blood Updated: 11/06/21 0710     Special Requests: --        RIGHT  Antecubital       Culture result: NO GROWTH AFTER 16 HOURS       CULTURE, BLOOD #2 [[id number]] Collected: 11/05/21 1258    Order Status: Completed Specimen: Blood Updated: 11/06/21 0710     Special Requests: --        LEFT  HAND       Culture result: NO GROWTH AFTER 16 HOURS       S. Chyrl Necessary, UR/CSF [[id number]] Collected: 11/05/21 2157    Order Status: Completed Updated: 11/05/21 2223    LEGIONELLA PNEUMOPHILA AG, URINE [[id number]] Collected: 11/05/21 2157    Order Status: Completed Specimen: Urine Updated: 11/05/21 2223          Other Studies:  No results found.     Current Meds:  Current Facility-Administered Medications   Medication Dose Route Frequency    sodium chloride (NS) flush 5-10 mL  5-10 mL IntraVENous PRN    sodium chloride (NS) flush 5-40 mL  5-40 mL IntraVENous Q8H    sodium chloride (NS) flush 5-40 mL  5-40 mL IntraVENous PRN    acetaminophen (TYLENOL) tablet 650 mg  650 mg Oral Q6H PRN    Or    acetaminophen (TYLENOL) suppository 650 mg  650 mg Rectal Q6H PRN    polyethylene glycol (MIRALAX) packet 17 g  17 g Oral DAILY PRN    ondansetron (ZOFRAN ODT) tablet 4 mg  4 mg Oral Q8H PRN    Or    ondansetron (ZOFRAN) injection 4 mg  4 mg IntraVENous Q6H PRN    enoxaparin (LOVENOX) injection 30 mg  30 mg SubCUTAneous Q12H    dexamethasone (DECADRON) 10 mg/mL injection 6 mg  6 mg IntraVENous Q24H    albuterol-ipratropium (DUO-NEB) 2.5 MG-0.5 MG/3 ML  3 mL Nebulization Q4H PRN    budesonide-formoteroL (SYMBICORT) 160-4.5 mcg/actuation HFA inhaler 2 Puff  2 Puff Inhalation BID RT    benzonatate (TESSALON) capsule 200 mg  200 mg Oral TID PRN    levothyroxine (SYNTHROID) tablet 25 mcg  25 mcg Oral ACB    melatonin tablet 3 mg  3 mg Oral DAILY PRN    calcium-vitamin D (OS-ELHAM +D3) 500 mg-200 unit per tablet 1 Tablet  1 Tablet Oral DAILY WITH BREAKFAST    baricitinib (OLUMIANT) tablet 4 mg  4 mg Oral Q24H    tuberculin injection 5 Units  5 Units IntraDERMal ONCE       Signed:  Tia Chris MD

## 2021-11-06 NOTE — PROGRESS NOTES
[facility] Pharmacokinetic Monitoring Service - Vancomycin     Jacquelyn Gorman is a 65 y.o. male starting on vancomycin therapy for bloodstream infection. Pharmacy consulted by [facility] for monitoring and adjustment. Target Concentration: Goal AUC/RIGO 400-600 mg*hr/L    Additional Antimicrobials: -    Pertinent Laboratory Values: Wt Readings from Last 1 Encounters:   11/06/21 89.1 kg (196 lb 8 oz)     Temp Readings from Last 1 Encounters:   11/06/21 98.7 °F (37.1 °C)     No components found for: PROCAL  Recent Labs     11/06/21  0551 11/05/21  0916   BUN 16 13   CREA 0.72* 0.73*   WBC 5.1 4.2*   PCT  --  <0.05     Estimated Creatinine Clearance: 101.8 mL/min (A) (based on SCr of 0.72 mg/dL (L)). No results found for: Madonna Whittaker    MRSA Nasal Swab: N/A. Non-respiratory infection. .    Plan:  Dosing recommendations based on Bayesian software  Start vancomycin 2gm followed by 1gm q12hr  Anticipated AUC of 455 and trough concentration of 14.3 at steady state  Renal labs as indicated   Pharmacy will continue to monitor patient and adjust therapy as indicated    Thank you for the consult,  WHITNEY Horowitz

## 2021-11-06 NOTE — PROGRESS NOTES
MD Rabia Gray made aware via [employer] of slightly elevated troponin level 26.3. No new orders at this time. Will continue to monitor.

## 2021-11-06 NOTE — PROGRESS NOTES
Lying quietly in bed, order received for vanc dosing per pharmacy. Oxygen now on 6lpm.sat 92%. Denies any pain.

## 2021-11-06 NOTE — PROGRESS NOTES
ACUTE PHYSICAL THERAPY GOALS:  (Developed with and agreed upon by patient and/or caregiver.)  STG:  (1.)Mr. Antoinette Back will move from supine to sit and sit to supine , scoot up and down and roll side to side with MODIFIED INDEPENDENCE within 4 treatment day(s). (2.)Mr. Antoinette Back will transfer from bed to chair and chair to bed with MODIFIED INDEPENDENCE using the least restrictive device within 4 treatment day(s). (3.)Mr. Antoinette Back will ambulate with MODIFIED INDEPENDENCE for 150 feet with the least restrictive device within 4 treatment day(s). LTG:  (1.)Mr. Antoinette Back will move from supine to sit and sit to supine , scoot up and down and roll side to side in bed with INDEPENDENT within 7 treatment day(s). (2.)Mr. Antoinette Back will transfer from bed to chair and chair to bed with INDEPENDENT using the least restrictive device within 7 treatment day(s). (3.)Mr. Antoinette Back will ambulate with INDEPENDENT for 300 feet with the least restrictive device within 7 treatment day(s).   ________________________________________________________________________________________________    PHYSICAL THERAPY ASSESSMENT: Initial Assessment and PM PT Treatment Day # 1      Anum Ramon is a 65 y.o. male   PRIMARY DIAGNOSIS: Acute hypoxemic respiratory failure due to COVID-19 [facility])  Acute hypoxemic respiratory failure due to COVID-19 ([facility] [U07.1, J96.01]  Sepsis due to COVID-19 ([facility] [U07.1, A41.89]       Reason for Referral:    ICD-10: Treatment Diagnosis: Generalized Muscle Weakness (M62.81)  Other lack of cordination (R27.8)  Difficulty in walking, Not elsewhere classified (R26.2)  Other abnormalities of gait and mobility (R26.89)  [place]PAT[place] / [place] / Product Type: Medicare /     ASSESSMENT:     REHAB RECOMMENDATIONS:   Recommendation to date pending progress:  [address] [place] [address]  Equipment:    To Be Determined     PRIOR LEVEL OF FUNCTION:  (Prior to Hospitalization) INITIAL/CURRENT LEVEL OF FUNCTION:  (Most Recently Demonstrated)   Bed Mobility:   Independent  Sit to Stand:   Independent  Transfers:   Independent  Gait/Mobility:   Independent Bed Mobility:   Standby Assistance  Sit to Stand:  Marguerite Betts Assistance  Transfers:   Standby Assistance  Gait/Mobility:   Standby Assistance     ASSESSMENT:  Mr. Tanisha Blanco is a pleasant retired male with above diagnosis who demonstrates with decreased transfers, ambulation and mobility below his prior functional baseline. All transfers are currently limited at SBA x 1 in room followed by ambulation for 30 feet on 6 liters 02 nasal cannula with the deficits stated below. Royce Sanders then returns to room and is seated bedside chair with SBA x 1. Transient SOB is denoted. He also states he uses up to 5 liters 02 nasal cannula 02 at home. Skilled PT is indicated for this patient's functional mobility deficits. SUBJECTIVE:   Mr. Tanisha Blanco states, \"I am walking in room but still get short of breath . \"    SOCIAL HISTORY/LIVING ENVIRONMENT  :Home Environment: Private residence  One/Two Story Residence: Two story, live on 1st floor  Living Alone: No  Support Systems: Other Family Member(s), Spouse/Significant Other  OBJECTIVE:     PAIN: VITAL SIGNS: LINES/DRAINS:   Pre Treatment: Pain Screen  Pain Scale 1: Numeric (0 - 10)  Pain Intensity 1: 0  Post Treatment: 0/10    ____  O2 Device: Nasal cannula     GROSS EVALUATION:   Within Functional Limits Abnormal/ Functional Abnormal/ Non-Functional (see comments) Not Tested Coments:   AROM [x] [] [] []    PROM [x] [] [] []    Strength [x] [] [] []    Balance [x] [] [] []    Posture [x] [] [] []    Sensation [x] [] [] []    Coordination [x] [] [] []    Tone [x] [] [] []    Edema [x] [] [] []    Activity Tolerance [] [x] [] []     [] [] [] []      COGNITION/  PERCEPTION: Intact Impaired   (see comments) Comments:   Orientation [x] []    Vision [x] []    Hearing [x] []    Command Following [x] []    Safety Awareness [x] []     [] []      MOBILITY: I Mod I S SBA CGA Min Mod Max Total  NT x2 Comments:   Bed Mobility    Rolling [] [] [] [x] [] [] [] [] [] [] []    Supine to Sit [] [] [] [x] [] [] [] [] [] [] []    Scooting [] [] [] [x] [] [] [] [] [] [] []    Sit to Supine [] [] [] [x] [] [] [] [] [] [] []    Transfers    Sit to Stand [] [] [] [x] [] [] [] [] [] [] []    Bed to Chair [] [] [] [x] [] [] [] [] [] [] []    Stand to Sit [] [] [] [x] [] [] [] [] [] [] []    I=Independent, Mod I=Modified Independent, S=Supervision, SBA=Standby Assistance, CGA=Contact Guard Assistance,   Min=Minimal Assistance, Mod=Moderate Assistance, Max=Maximal Assistance, Total=Total Assistance, NT=Not Tested  GAIT: I Mod I S SBA CGA Min Mod Max Total  NT x2 Comments:   Level of Assistance [] [] [] [x] [] [] [] [] [] [] []    Distance 30 feet     DME None    Gait Quality Good dynamic standing balance     Weightbearing Status WBAT     I=Independent, Mod I=Modified Independent, S=Supervision, SBA=Standby Assistance, CGA=Contact Guard Assistance,   Min=Minimal Assistance, Mod=Moderate Assistance, Max=Maximal Assistance, Total=Total Assistance, NT=Not Tested    [facility] [place] [place] [facility] 6 Clicks   Basic Mobility Inpatient Short Form       How much difficulty does the patient currently have. .. Unable A Lot A Little None   1. Turning over in bed (including adjusting bedclothes, sheets and blankets)? [] 1   [] 2   [] 3   [x] 4   2. Sitting down on and standing up from a chair with arms ( e.g., wheelchair, bedside commode, etc.)   [] 1   [] 2   [] 3   [x] 4   3. Moving from lying on back to sitting on the side of the bed? [] 1   [] 2   [] 3   [x] 4   How much help from another person does the patient currently need. .. Total A Lot A Little None   4. Moving to and from a bed to a chair (including a wheelchair)? [] 1   [] 2   [] 3   [x] 4   5. Need to walk in hospital room? [] 1   [] 2   [] 3   [x] 4   6.   Climbing 3-5 steps with a railing? [] 1   [] 2   [x] 3   [] 4   © 2007, Trustees of [address] [place] [address] [address] [id number], under license to [employer]. All rights reserved     Score:  Initial: 23 Most Recent: X (Date: -- )    Interpretation of Tool:  Represents activities that are increasingly more difficult (i.e. Bed mobility, Transfers, Gait). PLAN:   FREQUENCY/DURATION: PT Plan of Care: 3 times/week for duration of hospital stay or until stated goals are met, whichever comes first.    PROBLEM LIST:   (Skilled intervention is medically necessary to address:)  1. Decreased Balance  2. Decreased Cognition  3. Decreased Coordination  4. Decreased Gait Ability  5. Decreased Strength  6. Decreased Transfer Abilities   INTERVENTIONS PLANNED:   (Benefits and precautions of physical therapy have been discussed with the patient.)  1. Therapeutic Activity  2. Therapeutic Exercise/HEP  3. Neuromuscular Re-education  4. Gait Training  5. Manual Therapy  6. Education     TREATMENT:     EVALUATION: Low Complexity : (Untimed Charge)    TREATMENT:   ($$ Therapeutic Activity: 23-37 mins    )  Therapeutic Activity (23 Minutes): Therapeutic activity included Scooting, Lateral Scooting, Transfer Training, Ambulation on level ground, Sitting balance  and Standing balance to improve functional Mobility, Strength, ROM and Activity tolerance.     TREATMENT GRID:   Date:   Date:   Date:     Activity/Exercise Parameters Parameters Parameters                                                   AFTER TREATMENT POSITION/PRECAUTIONS:  Chair and RN notified    INTERDISCIPLINARY COLLABORATION:  RN/PCT and PT/PTA    TOTAL TREATMENT DURATION:  PT Patient Time In/Time Out  Time In: 1400  Time Out: [place] [address], PT

## 2021-11-06 NOTE — PROGRESS NOTES
ACUTE OT GOALS:  (Developed with and agreed upon by patient and/or caregiver.)  1) Patient will complete total body bathing and dressing with SET UP and adaptive equipment as needed. 2) Patient will complete toileting INDEPENDENTLY. 3) Patient will complete functional transfers INDEPENDENTLY. 4) Patient will tolerate at least 25 minutes of OT activity with 1-2 rest breaks while maintaining O2 sats >90%. 5) Patient will verbalize at least 3 energy conservation technique to utilize during ADL/IADL. 6) Patient will complete grooming ADL INDEPENDENTLY. Timeframe: 7 visits     OCCUPATIONAL THERAPY ASSESSMENT: Initial Assessment and Daily Note OT Treatment Day # 1    Estefani Ortega is a 65 y.o. male   PRIMARY DIAGNOSIS: Acute hypoxemic respiratory failure due to COVID-19 [facility])  Acute hypoxemic respiratory failure due to COVID-19 ([facility] [address] [U07.1, J96.01]  Sepsis due to COVID-19 ([facility]) [U07.1, A41.89]       Reason for Referral:    ICD-10: Treatment Diagnosis: Generalized Muscle Weakness (M62.81)  [place]PAT[place] / [place] A [place] B / Product Type: Medicare /   ASSESSMENT:     REHAB RECOMMENDATIONS:   Recommendation to date pending progress:  [address] [place] [address]  Equipment:    To Be Determined     PRIOR LEVEL OF FUNCTION:  (Prior to Hospitalization)  INITIAL/CURRENT LEVEL OF FUNCTION:  (Based on today's evaluation)   Bathing:   Independent  Dressing:   Independent  Feeding/Grooming:   Independent  Toileting:   Independent  Functional Mobility:   Independent Bathing:   Contact Guard Assistance  Dressing:   Contact Guard Assistance  Feeding/Grooming:   Set Up  Toileting:   Standby Assistance  Functional Mobility:   Standby Assistance     ASSESSMENT:  Mr. Shin Abraham is a 66 y/o male presents with acute respiratory failure due to COVID 19. Today pt presents with decreased activity tolerance and strength impacting ADLs.  Upon arrival pt 4L O2 NC and sats 91% at rest. Pt completed functional mobility of household distances and grooming ADLs in grid below. Pt noted to have decreased insight into O2 needs/deficits and was noticeably SOB at rest and after activity. Pt O2 sats 87% on 4L and required 6L to reach 90%. Pt O2 sats never >91%. Pt educated on pursed lip breathing and energy conservation techniques in prep for ADLs. Pt is currently functioning below baseline and would benefit from skilled OT services to address OT goals and plan of care. SUBJECTIVE:   Mr. Thom Soto states, \"I think I went home from the other hospital on 6L. \"    SOCIAL HISTORY/LIVING ENVIRONMENT: lives with wife, 2 level home with all needs on 1st level, 7STE, walk in shower w/ [place] but does not use it, SPC but does not use it, no falls, drives  Home Environment: Private residence  One/Two Story Residence: Two story, live on 1st floor  Living Alone: No  Support Systems: Other Family Member(s), Spouse/Significant Other    OBJECTIVE:     PAIN: VITAL SIGNS: LINES/DRAINS:   Pre Treatment: Pain Screen  Pain Scale 1: Numeric (0 - 10)  Pain Intensity 1: 0  Post Treatment: 0 Vital Signs  O2 Sat (%): 91 %  O2 Device: Nasal cannula  O2 Flow Rate (L/min): 6 l/min none  O2 Device: Nasal cannula     GROSS EVALUATION:  BUE Within Functional Limits Abnormal/ Functional Abnormal/ Non-Functional (see comments) Not Tested Comments:   AROM [x] [] [] []    PROM [] [] [] [x]    Strength [] [x] [] [] 4/5 BUE   Balance [] [x] [] [] SBA   Posture [x] [] [] []    Sensation [x] [] [] []    Coordination [x] [] [] []    Tone [] [] [] [x]    Edema [] [] [] [x]    Activity Tolerance [] [x] [] [] 6L O2 NC    [] [] [] []      COGNITION/  PERCEPTION: Intact Impaired   (see comments) Comments:   Orientation [x] []    Vision [] [x] Wears glasses   Hearing [x] []    Judgment/ Insight [] [x] Decreased insight into deficits   Attention [x] []    Memory [] [x] Seemed \"foggy\"   Command Following [x] []    Emotional Regulation [x] []     [] []      ACTIVITIES OF DAILY LIVING: I Mod I S SBA CGA Min Mod Max Total NT Comments   BASIC ADLs:              Bathing/ Showering [] [] [] [] [] [] [] [] [] [x]    Toileting [] [] [] [] [] [] [] [] [] [x]    Dressing [] [] [] [] [] [] [] [] [] [x]    Feeding [] [] [x] [] [] [] [] [] [] [] Set up in chair   Grooming [] [] [x] [] [] [] [] [] [] [] Set up combing hair in chair   Personal Device Care [x] [] [] [] [] [] [] [] [] [] Donning/doffing glasses   Functional Mobility [] [] [] [x] [] [] [] [] [] [] No AD   I=Independent, Mod I=Modified Independent, S=Supervision, SBA=Standby Assistance, CGA=Contact Guard Assistance,   Min=Minimal Assistance, Mod=Moderate Assistance, Max=Maximal Assistance, Total=Total Assistance, NT=Not Tested    MOBILITY: I Mod I S SBA CGA Min Mod Max Total  NT x2 Comments: pt received sitting in chair   Supine to sit [] [] [] [] [] [] [] [] [] [x] []    Sit to supine [] [] [] [] [] [] [] [] [] [x] []    Sit to stand [] [] [] [x] [] [] [] [] [] [] [] No AD   Bed to chair [] [] [] [x] [] [] [] [] [] [] [] No AD   I=Independent, Mod I=Modified Independent, S=Supervision, SBA=Standby Assistance, CGA=Contact Guard Assistance,   Min=Minimal Assistance, Mod=Moderate Assistance, Max=Maximal Assistance, Total=Total Assistance, NT=Not Tested    [facility] [address] [address] AM-PAC 6 Clicks   Daily Activity Inpatient Short Form        How much help from another person does the patient currently need. .. Total A Lot A Little None   1. Putting on and taking off regular lower body clothing? [] 1   [] 2   [] 3   [x] 4   2. Bathing (including washing, rinsing, drying)? [] 1   [] 2   [x] 3   [] 4   3. Toileting, which includes using toilet, bedpan or urinal?   [] 1   [] 2   [x] 3   [] 4   4. Putting on and taking off regular upper body clothing? [] 1   [] 2   [] 3   [x] 4   5. Taking care of personal grooming such as brushing teeth? [] 1   [] 2   [] 3   [x] 4   6. Eating meals?    [] 1   [] 2   [] 3   [x] 4   © 2007, Trustees of [facility] [address] [address], under license to [employer]. All rights reserved     Score:  Initial: 22 Most Recent: X (Date: -- )   Interpretation of Tool:  Represents activities that are increasingly more difficult (i.e. Bed mobility, Transfers, Gait). PLAN:   FREQUENCY/DURATION: OT Plan of Care: 3 times/week for duration of hospital stay or until stated goals are met, whichever comes first.    PROBLEM LIST:   (Skilled intervention is medically necessary to address:)  1. Decreased ADL/Functional Activities  2. Decreased Activity Tolerance  3. Decreased Balance  4. Decreased Strength   INTERVENTIONS PLANNED:   (Benefits and precautions of occupational therapy have been discussed with the patient.)  1. Self Care Training  2. Therapeutic Activity  3. Therapeutic Exercise/HEP  4. Neuromuscular Re-education  5. Education     TREATMENT:     EVALUATION: Low Complexity : (Untimed Charge)    TREATMENT:   ($$ Self Care/Home Management: 8-22 mins    )  Self Care (9 Minutes): Self care including Grooming, Energy Conservation Training and functional transfers in prep for ADLs and ambulating household distances to increase independence and decrease level of assistance required.     TREATMENT GRID:  N/A    AFTER TREATMENT POSITION/PRECAUTIONS:  Chair, Needs within reach and RN notified    INTERDISCIPLINARY COLLABORATION:  RN/PCT and OT/GOSS    TOTAL TREATMENT DURATION:  OT Patient Time In/Time Out  Time In: 0909  Time Out: [address], OT

## 2021-11-06 NOTE — PROGRESS NOTES
No acute events overnight. Pt is alert and oriented x4, resting comfortably in bed on 4L NC. SpO2 91% per continuous pulse ox at bedside. RR are even and unlabored, no apparent distress noted. Pt denies pain at this time. Safety measures in place, call light within reach. Will prepare for bedside shift report to oncoming RN.

## 2021-11-06 NOTE — ED PROVIDER NOTES
Presents with complaint of shortness of breath. Patient was diagnosed with Covid on 11/1. He was sick with covid sx for 7 days when he presented. He was admitted to [facility] and wanted to leave the next and was sent home on 6 L of oxygen. Patient states that he thought he was doing okay but family wanted him to come back. He reports shortness of breath with activity. EMS reports O2 sats on 6 L in the 80s. He is dyspneic while talking to me. He denies chest pain, diarrhea, fever. He is unvaccinated. He is on steroids. He did not get toci. The history is provided by the patient. The history is limited by the condition of the patient.    Positive For Covid-19  Severity:  Severe  Onset quality:  Gradual  Progression:  Worsening  Chronicity:  New  Relieved by:  Acetaminophen  Ineffective treatments:  Acetaminophen  Associated symptoms: cough and myalgias    Associated symptoms: no chest pain, no chills, no confusion, no congestion, no diarrhea, no dysuria, no headaches and no vomiting         Past Medical History:   Diagnosis Date    Bradycardia     CAD (coronary artery disease)     2 stents placed in 2007 in [place] ; now [facility] by [facility]     Essential hypertension     no meds     Hemochromatosis     not followed by Hematology     Hyperlipidemia     no meds     Ill-defined condition     has prosthetic left eye     Lower back pain     Melanoma ([address] 1986    left eye; eye was removed     OA (osteoarthritis)     Thyroid disease     Hypo=on meds    Vasovagal syncope 2014    while starting an IV for a colonoscopy        Past Surgical History:   Procedure Laterality Date    HX CORONARY STENT PLACEMENT  2007    2 stents     HX HEENT Left 1986    left eye removed due to melanoma ; has prosthetic left eye     HX ORTHOPAEDIC Right 1975    ligament repair right ankle     HX SHOULDER ARTHROSCOPY Right 1984         Family History:   Problem Relation Age of Onset    Coronary Art Dis Father  Cancer Father     Deep Vein Thrombosis Father     Emphysema Mother        Social History     Socioeconomic History    Marital status:      Spouse name: Not on file    Number of children: Not on file    Years of education: Not on file    Highest education level: Not on file   Occupational History    Not on file   Tobacco Use    Smoking status: Never Smoker    Smokeless tobacco: Never Used   Substance and Sexual Activity    Alcohol use: Never    Drug use: Never    Sexual activity: Not on file   Other Topics Concern    Not on file   Social History Narrative    Not on file     Social Determinants of Health     Financial Resource Strain:     Difficulty of Paying Living Expenses: Not on file   Food Insecurity:     Worried About Running Out of Food in the Last Year: Not on file    Jono of Food in the Last Year: Not on file   Transportation Needs:     Lack of Transportation (Medical): Not on file    Lack of Transportation (Non-Medical): Not on file   Physical Activity:     Days of Exercise per Week: Not on file    Minutes of Exercise per Session: Not on file   Stress:     Feeling of Stress : Not on file   Social Connections:     Frequency of Communication with Friends and Family: Not on file    Frequency of Social Gatherings with Friends and Family: Not on file    Attends Anabaptism Services: Not on file    Active Member of [address] [place] [address] [employer] or Organizations: Not on file    Attends Club or Organization Meetings: Not on file    Marital Status: Not on file   Intimate Partner Violence:     Fear of Current or Ex-Partner: Not on file    Emotionally Abused: Not on file    Physically Abused: Not on file    Sexually Abused: Not on file   Housing Stability:     Unable to Pay for Housing in the Last Year: Not on file    Number of [place] in the Last Year: Not on file    Unstable Housing in the Last Year: Not on file         ALLERGIES: Patient has no known allergies.     Review of Systems Constitutional: Negative for chills and fever. HENT: Negative for congestion. Respiratory: Positive for cough. Cardiovascular: Negative for chest pain. Gastrointestinal: Negative for diarrhea and vomiting. Genitourinary: Negative for dysuria. Musculoskeletal: Positive for myalgias. Neurological: Negative for headaches. Psychiatric/Behavioral: Negative for confusion. All other systems reviewed and are negative. Vitals:    11/05/21 2054 11/05/21 2328 11/06/21 0336 11/06/21 0750   BP:  (!) 144/81 (!) 148/88 (!) 149/85   Pulse:  63 70 61   Resp:  22 20 20   Temp:  98.8 °F (37.1 °C) 98.8 °F (37.1 °C) 98.1 °F (36.7 °C)   SpO2: 94% 90% 96% 90%   Weight:   89.1 kg (196 lb 8 oz)             Physical Exam  Vitals and nursing note reviewed. Constitutional:       General: He is in acute distress. Appearance: Normal appearance. He is well-developed. He is ill-appearing. He is not diaphoretic. HENT:      Head: Normocephalic and atraumatic. Eyes:      General:         Right eye: No discharge. Left eye: No discharge. Conjunctiva/sclera: Conjunctivae normal.   Cardiovascular:      Rate and Rhythm: Normal rate and regular rhythm. Pulmonary:      Effort: Respiratory distress present. Breath sounds: Rales present. Abdominal:      General: There is no distension. Palpations: Abdomen is soft. Tenderness: There is no abdominal tenderness. Musculoskeletal:         General: Normal range of motion. Cervical back: Normal range of motion and neck supple. Skin:     General: Skin is warm and dry. Capillary Refill: Capillary refill takes less than 2 seconds. Neurological:      Mental Status: He is alert and oriented to person, place, and time. Cranial Nerves: No cranial nerve deficit.    Psychiatric:         Mood and Affect: Mood normal.         Behavior: Behavior normal.          MDM  Number of Diagnoses or Management Options  Acute hypoxemic respiratory failure due to COVID-19 [facility])  Diagnosis management comments: Patient is agreeable to admission now. He is on 4 L with significant dyspnea. His Decadron was continued.        Amount and/or Complexity of Data Reviewed  Clinical lab tests: ordered and reviewed  Tests in the radiology section of CPT®: ordered and reviewed  Review and summarize past medical records: yes  Discuss the patient with other providers: yes    Risk of Complications, Morbidity, and/or Mortality  Presenting problems: high  Diagnostic procedures: minimal  Management options: high    Patient Progress  Patient progress: improved         Procedures

## 2021-11-07 LAB
ACC. NO. FROM MICRO ORDER, ACCP: ABNORMAL
ALBUMIN SERPL-MCNC: 2.2 G/DL (ref 3.2–4.6)
ALBUMIN/GLOB SERPL: 0.6 {RATIO} (ref 1.2–3.5)
ALP SERPL-CCNC: 60 U/L (ref 50–136)
ALT SERPL-CCNC: 60 U/L (ref 12–65)
ANION GAP SERPL CALC-SCNC: 6 MMOL/L (ref 7–16)
AST SERPL-CCNC: 26 U/L (ref 15–37)
BACTERIA SPEC CULT: ABNORMAL
BACTERIA SPEC CULT: ABNORMAL
BILIRUB SERPL-MCNC: 1.2 MG/DL (ref 0.2–1.1)
BUN SERPL-MCNC: 15 MG/DL (ref 8–23)
CALCIUM SERPL-MCNC: 8.4 MG/DL (ref 8.3–10.4)
CHLORIDE SERPL-SCNC: 104 MMOL/L (ref 98–107)
CO2 SERPL-SCNC: 30 MMOL/L (ref 21–32)
CREAT SERPL-MCNC: 0.77 MG/DL (ref 0.8–1.5)
CRP SERPL-MCNC: 2.4 MG/DL (ref 0–0.9)
ERYTHROCYTE [DISTWIDTH] IN BLOOD BY AUTOMATED COUNT: 12.7 % (ref 11.9–14.6)
FLUID CULTURE, SPNG2: NORMAL
GLOBULIN SER CALC-MCNC: 3.6 G/DL (ref 2.3–3.5)
GLUCOSE SERPL-MCNC: 85 MG/DL (ref 65–100)
GRAM STN SPEC: ABNORMAL
HCT VFR BLD AUTO: 42.6 % (ref 41.1–50.3)
HGB BLD-MCNC: 14.2 G/DL (ref 13.6–17.2)
INTERPRETATION: ABNORMAL
L PNEUMO1 AG UR QL IA: NEGATIVE
LACTATE SERPL-SCNC: 1.7 MMOL/L (ref 0.4–2)
MAGNESIUM SERPL-MCNC: 2 MG/DL (ref 1.8–2.4)
MCH RBC QN AUTO: 31.5 PG (ref 26.1–32.9)
MCHC RBC AUTO-ENTMCNC: 33.3 G/DL (ref 31.4–35)
MCV RBC AUTO: 94.5 FL (ref 79.6–97.8)
MECA (METHICILLIN-RESISTANCE GENES), MRGP: DETECTED
NRBC # BLD: 0 K/UL (ref 0–0.2)
ORGANISM ID, SPNG3: NORMAL
PLATELET # BLD AUTO: 178 K/UL (ref 150–450)
PLEASE NOTE, SPNG4: NORMAL
PMV BLD AUTO: 11.1 FL (ref 9.4–12.3)
POTASSIUM SERPL-SCNC: 3.5 MMOL/L (ref 3.5–5.1)
PROT SERPL-MCNC: 5.8 G/DL (ref 6.3–8.2)
RBC # BLD AUTO: 4.51 M/UL (ref 4.23–5.6)
S PNEUM AG SPEC QL LA: NEGATIVE
SERVICE CMNT-IMP: ABNORMAL
SODIUM SERPL-SCNC: 140 MMOL/L (ref 136–145)
SPECIMEN SOURCE: NORMAL
SPECIMEN SOURCE: NORMAL
SPECIMEN, SPNG1: NORMAL
STAPHYLOCOCCUS, STAPP: DETECTED
WBC # BLD AUTO: 5 K/UL (ref 4.3–11.1)

## 2021-11-07 PROCEDURE — 65270000029 HC RM PRIVATE

## 2021-11-07 PROCEDURE — 83605 ASSAY OF LACTIC ACID: CPT

## 2021-11-07 PROCEDURE — 80053 COMPREHEN METABOLIC PANEL: CPT

## 2021-11-07 PROCEDURE — 94760 N-INVAS EAR/PLS OXIMETRY 1: CPT

## 2021-11-07 PROCEDURE — 83735 ASSAY OF MAGNESIUM: CPT

## 2021-11-07 PROCEDURE — 85027 COMPLETE CBC AUTOMATED: CPT

## 2021-11-07 PROCEDURE — 74011250637 HC RX REV CODE- 250/637: Performed by: FAMILY MEDICINE

## 2021-11-07 PROCEDURE — 36415 COLL VENOUS BLD VENIPUNCTURE: CPT

## 2021-11-07 PROCEDURE — 94640 AIRWAY INHALATION TREATMENT: CPT

## 2021-11-07 PROCEDURE — 77010033678 HC OXYGEN DAILY

## 2021-11-07 PROCEDURE — 87040 BLOOD CULTURE FOR BACTERIA: CPT

## 2021-11-07 PROCEDURE — 86140 C-REACTIVE PROTEIN: CPT

## 2021-11-07 PROCEDURE — 74011250636 HC RX REV CODE- 250/636: Performed by: FAMILY MEDICINE

## 2021-11-07 RX ADMIN — Medication 1 TABLET: at 08:37

## 2021-11-07 RX ADMIN — BUDESONIDE AND FORMOTEROL FUMARATE DIHYDRATE 2 PUFF: 160; 4.5 AEROSOL RESPIRATORY (INHALATION) at 20:00

## 2021-11-07 RX ADMIN — Medication 10 ML: at 14:59

## 2021-11-07 RX ADMIN — DEXAMETHASONE SODIUM PHOSPHATE 6 MG: 10 INJECTION INTRAMUSCULAR; INTRAVENOUS at 08:36

## 2021-11-07 RX ADMIN — Medication 10 ML: at 05:11

## 2021-11-07 RX ADMIN — ENOXAPARIN SODIUM 30 MG: 100 INJECTION SUBCUTANEOUS at 17:09

## 2021-11-07 RX ADMIN — LEVOTHYROXINE SODIUM 25 MCG: 0.05 TABLET ORAL at 06:10

## 2021-11-07 RX ADMIN — BUDESONIDE AND FORMOTEROL FUMARATE DIHYDRATE 2 PUFF: 160; 4.5 AEROSOL RESPIRATORY (INHALATION) at 08:44

## 2021-11-07 RX ADMIN — VANCOMYCIN HYDROCHLORIDE 1000 MG: 1 INJECTION, POWDER, LYOPHILIZED, FOR SOLUTION INTRAVENOUS at 08:35

## 2021-11-07 RX ADMIN — BARICITINIB 4 MG: 2 TABLET, FILM COATED ORAL at 14:59

## 2021-11-07 RX ADMIN — Medication 10 ML: at 21:29

## 2021-11-07 RX ADMIN — ENOXAPARIN SODIUM 30 MG: 100 INJECTION SUBCUTANEOUS at 06:10

## 2021-11-07 NOTE — PROGRESS NOTES
No acute events overnight. Pt is alert and oriented x4, resting comfortably in bed on 4L NC. RR are even and unlabored, no apparent distress noted. Pt denies pain at this time. Call light within reach, safety measures in place. Will prepare for bedside shift report to oncoming RN.

## 2021-11-07 NOTE — PROGRESS NOTES
Pt is alert and oriented x4, resting comfortably in recliner on 6L NC. RR are even and unlabored, no apparent distress noted. Pt denies pain at this time. Safety measures in place, call light within reach. Pt instructed to call for assistance if needed. Will continue to monitor.

## 2021-11-07 NOTE — PROGRESS NOTES
Hospitalist Progress Note   Admit Date:  2021  9:06 AM   Name:  Mckay Angela   Age:  65 y.o. Sex:  male  :  1950   MRN:  [id number]   Room:  2/    Presenting Complaint: Positive For Covid-19    Reason(s) for Admission: Acute hypoxemic respiratory failure due to COVID-19 ([facility] [U07.1, J96.01]  Sepsis due to COVID-19 [facility]) [U07.1, A41.89]     Hospital Course & Interval History:   70M PMhx ocular melanoma, coronary artery disease s/p CABG, hypertension admitted  with acute respiratory failure secondary to COVID-19. His onset of symptoms was 10/22. He took ivermectin as an outpatient. He had not had the Covid vaccine. He was admitted to [facility] from  to  after leaving [address] He was given home steroids and antibiotics (he thinks just steroids). He did not receive tocilizumab. His symptoms continued to worsen to where he was satting in the 80s on 6 L. His family insisted on his return to the hospital where he was found to be acutely hypoxic and remaining Covid positive. He reported acute shortness of breath and cough. He denied any secondary symptoms including headache, nausea, vomiting, peripheral edema, changes in urine or changes in stool. He did not have a recent history of cancer. He had no history of blood clots. Subjective (21):  Continues to experience dyspnea, and fatigue. Reports recent rigors, feeling poorly. Initial blood cultures appeared positive for secondary infection, however, further evaluation showed probable contamination. He has no other new complaints today. He did indicate that he would like a different body. Lungs a bit less clear today. Assessment & Plan:   * Acute hypoxemic respiratory failure due to COVID-19 ([facility]  Covid + 10/22, O2 requirement  (2L)>>4L.   -Dexamethasone, baricitinib, vit D  -Supplemental O2, maintain sat > 92  -Pulmonary hygiene     : Oxygen requirement remains at 4 L, CRP elevated, continue current management    [facility])  Low suspicion for secondary infection; Procal negative; CRP 1.3; Given bolus in ED;  -pna labs  -blood cultures  -consider bacterial infection and antibiotics with changes    11/7: Micro results initially indicated possible MRSA infection with mec A gene in 1 bottle and gram-positive cocci in the other. Was given dose of vancomycin. This morning results returned coagulase positive suggestive of contamination, will continue to follow cultures and consider redraw with change in status      Body mass index (BMI) of 26.0 to 26.9 in adult  - healthy lifestyle at discharge    COVID-19 vaccine first dose not administered  -recommend adherence to CDC guidelines    Hypothyroidism  -levothyroxine        Dispo/Discharge Planning:      Home in 1-2d    Diet:  ADULT DIET Regular; doesn't like white milk, please send chocolate milk in place.   ADULT ORAL NUTRITION SUPPLEMENT Breakfast, Lunch, Dinner; Standard High Calorie/High Protein  DVT PPx: enoxaparin  Code status: Full Code    Hospital Problems as of 11/7/2021 Date Reviewed: 11/5/2021          Codes Class Noted - Resolved POA    * (Principal) Acute hypoxemic respiratory failure due to [facility] [place] [address]) ICD-10-CM: U07.1, J96.01  ICD-9-CM: 518.81, 079.89, 799.02  11/5/2021 - Present Yes        Sepsis [facility] to [facility]) ICD-10-CM: U07.1, A41.89  ICD-9-CM: 079.89, 995.91  11/5/2021 - Present Yes        COVID-19 vaccine first dose not administered ICD-10-CM: Z28.9  ICD-9-CM: V64.00  11/5/2021 - Present Yes        Body mass index (BMI) of 26.0 to 26.9 in adult ICD-10-CM: Z68.26  ICD-9-CM: V85.22  11/5/2021 - Present Yes        Elevated transaminase level ICD-10-CM: R74.01  ICD-9-CM: 790.4  11/5/2021 - Present Yes        Hypothyroidism (Chronic) ICD-10-CM: E03.9  ICD-9-CM: 244.9  11/1/2021 - Present Yes              Objective:     Patient Vitals for the past 24 hrs:   Temp Pulse Resp BP SpO2   11/07/21 1126 98.4 °F (36.9 °C) 60 18 (!) 142/80 95 %   11/07/21 0807 98 °F (36.7 °C) (!) 56 18 (!) 146/80 95 %   11/07/21 0730     97 %   11/07/21 0423 98 °F (36.7 °C) 61 18 (!) 146/89 90 %   11/06/21 2252 97.9 °F (36.6 °C) 62 18 (!) 157/87 95 %   11/06/21 2005     97 %   11/06/21 1930 98 °F (36.7 °C) 67 17 (!) 155/88 95 %   11/06/21 1549 98.7 °F (37.1 °C) 84 20 115/69 92 %     Oxygen Therapy  O2 Sat (%): 95 % (11/07/21 1126)  Pulse via Oximetry: 79 beats per minute (11/07/21 0730)  O2 Device: Nasal cannula (11/07/21 0840)  Skin Assessment: Clean, dry, & intact (11/07/21 0730)  Skin Protection for O2 Device: No (11/07/21 0607)  O2 Flow Rate (L/min): 4 l/min (11/07/21 0840)    Estimated body mass index is 26.65 kg/m² as calculated from the following:    Height as of this encounter: 6' (1.829 m). Weight as of this encounter: 89.1 kg (196 lb 8 oz). Intake/Output Summary (Last 24 hours) at 11/7/2021 1339  Last data filed at 11/7/2021 1010  Gross per 24 hour   Intake 1270 ml   Output    Net 1270 ml       Blood pressure (!) 142/80, pulse 60, temperature 98.4 °F (36.9 °C), resp. rate 18, height 6' (1.829 m), weight 89.1 kg (196 lb 8 oz), SpO2 95 %. Physical Exam  Vitals and nursing note reviewed. Constitutional:       General: He is not in acute distress. Appearance: Normal appearance. HENT:      Head: Normocephalic. Eyes:      Extraocular Movements: Extraocular movements intact. Comments: L enucleation with prosthesis   Cardiovascular:      Rate and Rhythm: Normal rate. Pulses:           Radial pulses are 2+ on the left side. Heart sounds: Normal heart sounds. Pulmonary:      Effort: Pulmonary effort is normal. No respiratory distress. Breath sounds: Rhonchi present. No wheezing. Abdominal:      General: Bowel sounds are normal.      Tenderness: There is no abdominal tenderness. Musculoskeletal:         General: No deformity. Cervical back: No rigidity.       Right lower leg: No edema. Left lower leg: No edema. Skin:     General: Skin is warm and dry. Neurological:      General: No focal deficit present. Mental Status: He is alert and oriented to person, place, and time.    Psychiatric:         Mood and Affect: Mood normal.         Behavior: Behavior normal.         I have reviewed ordered lab tests and independently visualized imaging below:    Recent Labs:  Recent Results (from the past 48 hour(s))   TROPONIN-HIGH SENSITIVITY    Collection Time: 11/05/21  2:41 PM   Result Value Ref Range    Troponin-High Sensitivity 26.3 (H) 0 - 14 pg/mL   C REACTIVE PROTEIN, QT    Collection Time: 11/06/21  5:51 AM   Result Value Ref Range    C-Reactive protein 1.1 (H) 0.0 - 0.9 mg/dL   CBC W/O DIFF    Collection Time: 11/06/21  5:51 AM   Result Value Ref Range    WBC 5.1 4.3 - 11.1 K/uL    RBC 4.65 4.23 - 5.6 M/uL    HGB 14.2 13.6 - 17.2 g/dL    HCT 42.6 41.1 - 50.3 %    MCV 91.6 79.6 - 97.8 FL    MCH 30.5 26.1 - 32.9 PG    MCHC 33.3 31.4 - 35.0 g/dL    RDW 12.6 11.9 - 14.6 %    PLATELET 034 835 - 813 K/uL    MPV 10.9 9.4 - 12.3 FL    ABSOLUTE NRBC 0.00 0.0 - 0.2 K/uL   METABOLIC PANEL, BASIC    Collection Time: 11/06/21  5:51 AM   Result Value Ref Range    Sodium 138 138 - 145 mmol/L    Potassium 3.9 3.5 - 5.1 mmol/L    Chloride 105 98 - 107 mmol/L    CO2 28 21 - 32 mmol/L    Anion gap 5 (L) 7 - 16 mmol/L    Glucose 101 (H) 65 - 100 mg/dL    BUN 16 8 - 23 MG/DL    Creatinine 0.72 (L) 0.8 - 1.5 MG/DL    GFR est AA >60 >60 ml/min/1.73m2    GFR est non-AA >60 >60 ml/min/1.73m2    Calcium 8.4 8.3 - 10.4 MG/DL   MAGNESIUM    Collection Time: 11/06/21  5:51 AM   Result Value Ref Range    Magnesium 2.2 1.8 - 2.4 mg/dL   PLEASE READ & DOCUMENT PPD TEST IN 24 HRS    Collection Time: 11/06/21  2:00 PM   Result Value Ref Range    PPD Negative Negative    mm Induration 0 0 - 5 mm   CBC W/O DIFF    Collection Time: 11/07/21  6:59 AM   Result Value Ref Range    WBC 5.0 4.3 - 11.1 K/uL    RBC 4. 51 4.23 - 5.6 M/uL    HGB 14.2 13.6 - 17.2 g/dL    HCT 42.6 41.1 - 50.3 %    MCV 94.5 79.6 - 97.8 FL    MCH 31.5 26.1 - 32.9 PG    MCHC 33.3 31.4 - 35.0 g/dL    RDW 12.7 11.9 - 14.6 %    PLATELET 438 743 - 682 K/uL    MPV 11.1 9.4 - 12.3 FL    ABSOLUTE NRBC 0.00 0.0 - 0.2 K/uL   METABOLIC PANEL, COMPREHENSIVE    Collection Time: 11/07/21  7:03 AM   Result Value Ref Range    Sodium 140 136 - 145 mmol/L    Potassium 3.5 3.5 - 5.1 mmol/L    Chloride 104 98 - 107 mmol/L    CO2 30 21 - 32 mmol/L    Anion gap 6 (L) 7 - 16 mmol/L    Glucose 85 65 - 100 mg/dL    BUN 15 8 - 23 MG/DL    Creatinine 0.77 (L) 0.8 - 1.5 MG/DL    GFR est AA >60 >60 ml/min/1.73m2    GFR est non-AA >60 >60 ml/min/1.73m2    Calcium 8.4 8.3 - 10.4 MG/DL    Bilirubin, total 1.2 (H) 0.2 - 1.1 MG/DL    ALT (SGPT) 60 12 - 65 U/L    AST (SGOT) 26 15 - 37 U/L    Alk. phosphatase 60 50 - 136 U/L    Protein, total 5.8 (L) 6.3 - 8.2 g/dL    Albumin 2.2 (L) 3.2 - 4.6 g/dL    Globulin 3.6 (H) 2.3 - 3.5 g/dL    A-G Ratio 0.6 (L) 1.2 - 3.5     C REACTIVE PROTEIN, QT    Collection Time: 11/07/21  7:03 AM   Result Value Ref Range    C-Reactive protein 2.4 (H) 0.0 - 0.9 mg/dL   MAGNESIUM    Collection Time: 11/07/21  7:03 AM   Result Value Ref Range    Magnesium 2.0 1.8 - 2.4 mg/dL       All Micro Results     Procedure Component Value Units Date/Time    BLOOD CULTURE ID PANEL [[id number]]  (Abnormal) Collected: 11/05/21 1158    Order Status: Completed Specimen: Blood Updated: 11/07/21 0704     Acc. no. from Micro Order [id number]     Staphylococcus Detected        Comment: RESULTS VERIFIED, PHONED TO AND READ BACK BY  KAMINI TEJEDA AT 1640 ON [id number] MT          mecA (Methicillin-Resistance Genes) Detected        Comment: Presence of mecA is highly indicative of methicillin resistance. The test does not replace traditional culture and susceptibilities        INTERPRETATION       Gram positive cocci in clusters.  Identified by realtime PCR as  Coagulase negative Staphylococci           Comment: A single positive culture of coagulase negative Staph is likely to be a contaminant in adult patients. Consider discontinuation of antibiotics for gram positive bloodstream infections if patient asymptomatic. THIS TEST DOES NOT REPLACE SENSITIVITY TESTING. CULTURE, BLOOD #2 [[id number]]  (Abnormal) Collected: 11/05/21 1158    Order Status: Completed Specimen: Blood Updated: 11/07/21 0648     Special Requests: --        LEFT  HAND       GRAM STAIN       GRAM POS COCCI IN CLUSTERS            AEROBIC BOTTLE POSITIVE               RESULTS VERIFIED, PHONED TO AND READ BACK BY KAMINI TEJEDA AT 1640 ON [id number]           Culture result:       STAPHYLOCOCCUS SPECIES, COAGULASE NEGATIVE This organism may be indicative of culture contamination. Clinical correlation needs to be evaluated as each case is unique. Refer to Blood Culture ID Panel Accession  [id number]      CULTURE, BLOOD #1 [[id number]] Collected: 11/05/21 1156    Order Status: Completed Specimen: Blood Updated: 11/07/21 0207     Special Requests: --        RIGHT  Antecubital       Culture result: NO GROWTH 2 DAYS       S. Juda Canavan, UR/CSF [[id number]] Collected: 11/05/21 2157    Order Status: Completed Updated: 11/05/21 2223    LEGIONELLA PNEUMOPHILA AG, URINE [[id number]] Collected: 11/05/21 2157    Order Status: Completed Specimen: Urine Updated: 11/05/21 2223          Other Studies:  No results found.     Current Meds:  Current Facility-Administered Medications   Medication Dose Route Frequency    sodium chloride (NS) flush 5-10 mL  5-10 mL IntraVENous PRN    sodium chloride (NS) flush 5-40 mL  5-40 mL IntraVENous Q8H    sodium chloride (NS) flush 5-40 mL  5-40 mL IntraVENous PRN    acetaminophen (TYLENOL) tablet 650 mg  650 mg Oral Q6H PRN    Or    acetaminophen (TYLENOL) suppository 650 mg  650 mg Rectal Q6H PRN    polyethylene glycol (MIRALAX) packet 17 g  17 g Oral DAILY PRN    ondansetron (ZOFRAN ODT) tablet 4 mg  4 mg Oral Q8H PRN    Or    ondansetron (ZOFRAN) injection 4 mg  4 mg IntraVENous Q6H PRN    enoxaparin (LOVENOX) injection 30 mg  30 mg SubCUTAneous Q12H    dexamethasone (DECADRON) 10 mg/mL injection 6 mg  6 mg IntraVENous Q24H    albuterol-ipratropium (DUO-NEB) 2.5 MG-0.5 MG/3 ML  3 mL Nebulization Q4H PRN    budesonide-formoteroL (SYMBICORT) 160-4.5 mcg/actuation HFA inhaler 2 Puff  2 Puff Inhalation BID RT    benzonatate (TESSALON) capsule 200 mg  200 mg Oral TID PRN    levothyroxine (SYNTHROID) tablet 25 mcg  25 mcg Oral ACB    melatonin tablet 3 mg  3 mg Oral DAILY PRN    calcium-vitamin D (OS-ELHAM +D3) 500 mg-200 unit per tablet 1 Tablet  1 Tablet Oral DAILY WITH BREAKFAST    baricitinib (OLUMIANT) tablet 4 mg  4 mg Oral Q24H       Signed:  Yulissa Balderas MD

## 2021-11-07 NOTE — PROGRESS NOTES
Comprehensive Nutrition Assessment    Type and Reason for Visit: Initial, Positive nutrition screen  Best Practice Alert for Malnutrition Screening Tool: Recently Lost Weight Without Trying: Yes, If Yes, How Much Weight Loss: 2-13 lbs, Eating Poorly Due to Decreased Appetite: Yes    Nutrition Recommendations/Plan:    Continue with current diet   Continue with Ensure enlive with all meals     Nutrition Assessment:   Nutrition History: Unable to obtain nutrition history      Nutrition Background: Patient with acute respiratory failure from covid. Onset of symptoms 10/22. Daily Update: Attempted to speak with patient x2, unable. Spoke with RN Kofi Barcenas who reports he eats about 50% of meals and today did not drink supplement. She says patient reports loss of appetite since he got covid, but was fine before that. Nutrition Related Findings:   NFPE deferred at this time due to covid isolation      Current Nutrition Therapies:  ADULT ORAL NUTRITION SUPPLEMENT Breakfast, Lunch, Dinner; Standard High Calorie/High Protein  ADULT DIET Regular; doesn't like white milk, please send chocolate milk in place. Current Intake:   Average Meal Intake: 51-75% Average Supplement Intake: Unable to assess      Anthropometric Measures:  Height: 6' (182.9 cm)  Current Body Wt: 89.1 kg (196 lb 6.9 oz) (11/6), Weight source: Bed scale  BMI: 26.6, Overweight (BMI 25.0-29. 9)     Ideal Body Weight (lbs) (Calculated): 178 lbs (81 kg), 110.4 %   Per EMR patient lost 10 lbs in 4.5 months (4.8%) and 20 lbs in 1 year (9.3%). Neither are significant amounts.            Edema: No data recorded   Estimated Daily Nutrient Needs:  Energy (kcal/day): 9537-8430 (Kcal/kg (20-25), Weight Used: Current (89.1 kg))  Protein (g/day):  g (20% kcals) Weight Used: ( )  Fluid (ml/day):   (1 ml/kcal)    Nutrition Diagnosis:   · Predicted inadequate energy intake related to inadequate protein-energy intake as evidenced by intake 51-75%, weight loss    Nutrition Interventions:   Food and/or Nutrient Delivery: Continue current diet, Continue oral nutrition supplement     Coordination of Nutrition Care: Continue to monitor while inpatient  Plan of Care discussed with RN, Juana Garnica    Goals: Active Goal: Intake >75% of nutritional needs from meals and ONS by follow up    Nutrition Monitoring and Evaluation:      Food/Nutrient Intake Outcomes: Food and nutrient intake, Supplement intake       Discharge Planning:     Too soon to determine    Electronically signed by Antonio Dominique MS, RD, LD on 11/7/2021 at 11:49 AM.

## 2021-11-08 VITALS
SYSTOLIC BLOOD PRESSURE: 116 MMHG | DIASTOLIC BLOOD PRESSURE: 61 MMHG | HEIGHT: 72 IN | TEMPERATURE: 98.3 F | RESPIRATION RATE: 20 BRPM | HEART RATE: 74 BPM | WEIGHT: 196.5 LBS | BODY MASS INDEX: 26.61 KG/M2 | OXYGEN SATURATION: 95 %

## 2021-11-08 PROBLEM — U07.1 ACUTE HYPOXEMIC RESPIRATORY FAILURE DUE TO COVID-19 (HCC): Status: RESOLVED | Noted: 2021-11-05 | Resolved: 2021-11-08

## 2021-11-08 PROBLEM — U07.1 SEPSIS DUE TO COVID-19 (HCC): Status: RESOLVED | Noted: 2021-11-05 | Resolved: 2021-11-08

## 2021-11-08 PROBLEM — J96.01 ACUTE HYPOXEMIC RESPIRATORY FAILURE DUE TO COVID-19 (HCC): Status: RESOLVED | Noted: 2021-11-05 | Resolved: 2021-11-08

## 2021-11-08 PROBLEM — A41.89 SEPSIS DUE TO COVID-19 (HCC): Status: RESOLVED | Noted: 2021-11-05 | Resolved: 2021-11-08

## 2021-11-08 PROBLEM — R74.01 ELEVATED TRANSAMINASE LEVEL: Status: RESOLVED | Noted: 2021-11-05 | Resolved: 2021-11-08

## 2021-11-08 LAB
ANION GAP SERPL CALC-SCNC: 5 MMOL/L (ref 7–16)
BUN SERPL-MCNC: 20 MG/DL (ref 8–23)
CALCIUM SERPL-MCNC: 8.7 MG/DL (ref 8.3–10.4)
CHLORIDE SERPL-SCNC: 104 MMOL/L (ref 98–107)
CO2 SERPL-SCNC: 28 MMOL/L (ref 21–32)
CREAT SERPL-MCNC: 0.73 MG/DL (ref 0.8–1.5)
CRP SERPL-MCNC: 5.4 MG/DL (ref 0–0.9)
ERYTHROCYTE [DISTWIDTH] IN BLOOD BY AUTOMATED COUNT: 12.7 % (ref 11.9–14.6)
GLUCOSE SERPL-MCNC: 95 MG/DL (ref 65–100)
HCT VFR BLD AUTO: 42.6 % (ref 41.1–50.3)
HGB BLD-MCNC: 14.4 G/DL (ref 13.6–17.2)
MAGNESIUM SERPL-MCNC: 2.2 MG/DL (ref 1.8–2.4)
MCH RBC QN AUTO: 31.6 PG (ref 26.1–32.9)
MCHC RBC AUTO-ENTMCNC: 33.8 G/DL (ref 31.4–35)
MCV RBC AUTO: 93.4 FL (ref 79.6–97.8)
NRBC # BLD: 0 K/UL (ref 0–0.2)
PLATELET # BLD AUTO: 222 K/UL (ref 150–450)
PMV BLD AUTO: 10.9 FL (ref 9.4–12.3)
POTASSIUM SERPL-SCNC: 3.8 MMOL/L (ref 3.5–5.1)
RBC # BLD AUTO: 4.56 M/UL (ref 4.23–5.6)
SODIUM SERPL-SCNC: 137 MMOL/L (ref 136–145)
WBC # BLD AUTO: 4.7 K/UL (ref 4.3–11.1)

## 2021-11-08 PROCEDURE — 97116 GAIT TRAINING THERAPY: CPT

## 2021-11-08 PROCEDURE — 97530 THERAPEUTIC ACTIVITIES: CPT

## 2021-11-08 PROCEDURE — 80048 BASIC METABOLIC PNL TOTAL CA: CPT

## 2021-11-08 PROCEDURE — 83735 ASSAY OF MAGNESIUM: CPT

## 2021-11-08 PROCEDURE — 74011250637 HC RX REV CODE- 250/637: Performed by: FAMILY MEDICINE

## 2021-11-08 PROCEDURE — 86140 C-REACTIVE PROTEIN: CPT

## 2021-11-08 PROCEDURE — 85027 COMPLETE CBC AUTOMATED: CPT

## 2021-11-08 PROCEDURE — 74011250636 HC RX REV CODE- 250/636: Performed by: FAMILY MEDICINE

## 2021-11-08 PROCEDURE — 36415 COLL VENOUS BLD VENIPUNCTURE: CPT

## 2021-11-08 PROCEDURE — 94640 AIRWAY INHALATION TREATMENT: CPT

## 2021-11-08 RX ORDER — DEXAMETHASONE 4 MG/1
6 TABLET ORAL DAILY
Status: DISCONTINUED | OUTPATIENT
Start: 2021-11-09 | End: 2021-11-08 | Stop reason: HOSPADM

## 2021-11-08 RX ORDER — DEXAMETHASONE 6 MG/1
6 TABLET ORAL DAILY
Qty: 3 TABLET | Refills: 0 | Status: SHIPPED | OUTPATIENT
Start: 2021-11-09 | End: 2021-11-12

## 2021-11-08 RX ADMIN — ENOXAPARIN SODIUM 30 MG: 100 INJECTION SUBCUTANEOUS at 06:04

## 2021-11-08 RX ADMIN — Medication 1 TABLET: at 09:08

## 2021-11-08 RX ADMIN — BUDESONIDE AND FORMOTEROL FUMARATE DIHYDRATE 2 PUFF: 160; 4.5 AEROSOL RESPIRATORY (INHALATION) at 08:00

## 2021-11-08 RX ADMIN — DEXAMETHASONE SODIUM PHOSPHATE 6 MG: 10 INJECTION INTRAMUSCULAR; INTRAVENOUS at 09:08

## 2021-11-08 RX ADMIN — Medication 10 ML: at 06:08

## 2021-11-08 RX ADMIN — LEVOTHYROXINE SODIUM 25 MCG: 0.05 TABLET ORAL at 06:04

## 2021-11-08 NOTE — ROUTINE PROCESS
Patients wife notified me that their pharmacy does not carry Olumiant. Notified Joi with case management and her search of local availability revealed that there is no availability of this drug in this area and there is nothing equal to it. Spoke with Dr Chelo Hernandez and he states that patient has maintained room air and he should be fine without it. Patient and his spouse made aware of this and they are agreeable for discharge. Patient left floor on RA to the company of his son in law with no distress noted.

## 2021-11-08 NOTE — PROGRESS NOTES
Patient with discharge orders. Patient will resume [facility], RN, PT/OT. Patient O2 qualifier completed; not requiring home O2 at present. Family to transport patient home. Patient has met all treatment goals and milestones. RNCM will continue to follow until discharged home today. Care Management Interventions  PCP Verified by CM:  Yes (Dr Carrasco Res @ [phone])  Mode of Transport at Discharge: Self  Transition of Care Consult (CM Consult): Home Health, Discharge Planning (current with [facility], would like to resume.)  [address]: No  Reason Outside [place]: Patient already serviced by other home care/hospice agency ([address] [place] [address])  Health Maintenance Reviewed: Yes  Physical Therapy Consult: Yes (ordered 11/05)  Occupational Therapy Consult: Yes (ordered 11/05)  Speech Therapy Consult: No  Support Systems: Other Family Member(s), Spouse/Significant Other  Confirm Follow Up Transport: Family  The Plan for Transition of Care is Related to the Following Treatment Goals : return to a safe level of independence  The Patient and/or Patient Representative was Provided with a Choice of Provider and Agrees with the Discharge Plan?: Yes  Freedom of Choice List was Provided with Basic Dialogue that Supports the Patient's Individualized Plan of Care/Goals, Treatment Preferences and Shares the Quality Data Associated with the Providers?: Yes  [place] Resource Information Provided?: No  Discharge Location  Discharge Placement: Home with home health

## 2021-11-08 NOTE — PROGRESS NOTES
ACUTE PHYSICAL THERAPY GOALS:  (Developed with and agreed upon by patient and/or caregiver.)  STG:  (1.)Mr. Thea Quintana will move from supine to sit and sit to supine , scoot up and down and roll side to side with MODIFIED INDEPENDENCE within 4 treatment day(s). (2.)Mr. Thea Quintana will transfer from bed to chair and chair to bed with MODIFIED INDEPENDENCE using the least restrictive device within 4 treatment day(s). (3.)Mr. Thea Quintana will ambulate with MODIFIED INDEPENDENCE for 150 feet with the least restrictive device within 4 treatment day(s).      LTG:  (1.)Mr. Thea Quintana will move from supine to sit and sit to supine , scoot up and down and roll side to side in bed with INDEPENDENT within 7 treatment day(s). (2.)Mr. Thea Quintana will transfer from bed to chair and chair to bed with INDEPENDENT using the least restrictive device within 7 treatment day(s). (3.)Mr. Thea Quintana will ambulate with INDEPENDENT for 300 feet with the least restrictive device within 7 treatment day(s). PHYSICAL THERAPY: Daily Note Treatment Day # 2    Bennie Verdin is a 65 y.o. male   PRIMARY DIAGNOSIS: Acute hypoxemic respiratory failure due to COVID-19 [facility])  Acute hypoxemic respiratory failure due to COVID-19 ([facility] [U07.1, J96.01]  Sepsis due to COVID-19 ([facility] [U07.1, A41.89]         ASSESSMENT:     REHAB RECOMMENDATIONS: CURRENT LEVEL OF FUNCTION:  (Most Recently Demonstrated)   Recommendation to date pending progress:  Setting:   No further skilled therapy vs HHPT  Equipment:    None Bed Mobility:   Not tested  Sit to Stand:   Independent  Transfers:   Independent  Gait/Mobility:   Supervision     ASSESSMENT:  Mr. Thea Quintana presents seated up in chair on 4L of O2 and is agreeable to therapy. He ambulated 240' in the room with supervision and SpO2 maintained at 93% or greater. RT entered the room and pt ambulated another 150' with no O2 and maintained SpO2 levels at 91% or greater. Pt left in room without O2 on and RN notified.  Pt SpO2 reading 90% at the end of the session on room air. Significant improvement in activity tolerance and O2 levels. PT will continue to follow. SUBJECTIVE:   Mr. Shin Abraham states, \"I feel pretty good. \"    SOCIAL HISTORY/ LIVING ENVIRONMENT: [facility] to [facility]  Home Environment: Private residence  One/Two Story Residence: Two story, live on 1st floor  Living Alone: No  Support Systems: Other Family Member(s), Spouse/Significant Other  OBJECTIVE:     PAIN: VITAL SIGNS: LINES/DRAINS:   Pre Treatment: Pain Screen  Pain Scale 1: Numeric (0 - 10)  Pain Intensity 1: 0  Post Treatment: 0   none  O2 Device: Nasal cannula     MOBILITY: I Mod I S SBA CGA Min Mod Max Total  NT x2 Comments:   Bed Mobility    Rolling [] [] [] [] [] [] [] [] [] [x] []    Supine to Sit [] [] [] [] [] [] [] [] [] [x] []    Scooting [] [] [] [] [] [] [] [] [] [x] []    Sit to Supine [] [] [] [] [] [] [] [] [] [x] []    Transfers    Sit to Stand [x] [] [] [] [] [] [] [] [] [] []    Bed to Chair [] [] [] [] [] [] [] [] [] [x] []    Stand to Sit [x] [] [] [] [] [] [] [] [] [] []    I=Independent, Mod I=Modified Independent, S=Supervision, SBA=Standby Assistance, CGA=Contact Guard Assistance,   Min=Minimal Assistance, Mod=Moderate Assistance, Max=Maximal Assistance, Total=Total Assistance, NT=Not Tested    BALANCE: Good Fair+ Fair Fair- Poor NT Comments   Sitting Static [x] [] [] [] [] []    Sitting Dynamic [x] [] [] [] [] []              Standing Static [x] [] [] [] [] []    Standing Dynamic [] [x] [] [] [] []      GAIT: I Mod I S SBA CGA Min Mod Max Total  NT x2 Comments:   Level of Assistance [] [] [x] [] [] [] [] [] [] [] []    Distance 240', 150'     DME None    Gait Quality Normal gait pattern    Weightbearing  Status N/A     I=Independent, Mod I=Modified Independent, S=Supervision, SBA=Standby Assistance, CGA=Contact Guard Assistance,   Min=Minimal Assistance, Mod=Moderate Assistance, Max=Maximal Assistance, Total=Total Assistance, NT=Not Tested    PLAN: FREQUENCY/DURATION: PT Plan of Care: 3 times/week for duration of hospital stay or until stated goals are met, whichever comes first.  TREATMENT:     TREATMENT:   ($$ Gait Trainin-22 mins    )  Therapeutic Exercise (5 Minutes): Therapeutic exercises noted below to improve functional activity tolerance, strength and mobility. Gait Training (14 Minutes): Gait training for 240, 150 feet utilizing no assistive device. Patient required no  cueing to improve Activity Pacing and Gait Mechanics.      TREATMENT GRID:   Date:  21 Date:   Date:     Activity/Exercise Parameters Parameters Parameters   Standing march 2x10     Standing heel raise 2x10     Mini squats 2x10                                   AFTER TREATMENT POSITION/PRECAUTIONS:  Chair, Needs within reach and RN notified    INTERDISCIPLINARY COLLABORATION:  RN/PCT, PT/PTA and RT    TOTAL TREATMENT DURATION:  PT Patient Time In/Time Out  Time In: 9  Time Out: Jen Malone [address] [place] [address]

## 2021-11-08 NOTE — DISCHARGE INSTRUCTIONS
Patient Education        10 Things to Do When You Have COVID-19    Stay home. Don't go to school, work, or public areas. And don't use public transportation, ride-shares, or taxis unless you have no choice. Leave your home only if you need to get medical care. But call the doctor's office first so they know you're coming. And wear a mask. Ask before leaving isolation. Follow your doctor's advice about when it is safe for you to leave isolation. Wear a mask when you are around other people. It can help stop the spread of the virus. Limit contact with people in your home. If possible, stay in a separate bedroom and use a separate bathroom. Avoid contact with pets and other animals. If possible, have a friend or family member care for them while you're sick. Cover your mouth and nose with a tissue when you cough or sneeze. Then throw the tissue in the trash right away. Wash your hands often, especially after you cough or sneeze. Use soap and water, and scrub for at least 20 seconds. If soap and water aren't available, use an alcohol-based hand . Don't share personal household items. These include bedding, towels, cups and glasses, and eating utensils. Clean and disinfect your home every day. Use household  or disinfectant wipes or sprays. If needed, take acetaminophen (Tylenol) or ibuprofen (Advil, Motrin) to relieve fever and body aches. Read and follow all instructions on the label. Current as of: March 26, 2021               Content Version: 13.0  © 2006-2021 [employer]. Care instructions adapted under license by [employer] (which disclaims liability or warranty for this information). If you have questions about a medical condition or this instruction, always ask your healthcare professional. [place] [address] any warranty or liability for your use of this information.        Patient Education Coronavirus (VRAIQ-06): Care Instructions  Overview  The coronavirus disease (COVID-19) is caused by a virus. Symptoms may include a fever, a cough, and shortness of breath. It mainly spreads person-to-person through droplets from coughing and sneezing. The virus also can spread when people are in close contact with someone who is infected. Most people have mild symptoms and can take care of themselves at home. If their symptoms get worse, they may need care in a hospital. Treatment may include medicines to reduce symptoms, plus breathing support such as oxygen therapy or a ventilator. It's important to not spread the virus to others. If you have COVID-19, wear a face cover anytime you are around other people. You need to isolate yourself while you are sick. Leave your home only if you need to get medical care or testing. Follow-up care is a key part of your treatment and safety. Be sure to make and go to all appointments, and call your doctor if you are having problems. It's also a good idea to know your test results and keep a list of the medicines you take. How can you care for yourself at home? · Get extra rest. It can help you feel better. · Drink plenty of fluids. This helps replace fluids lost from fever. Fluids also help ease a scratchy throat. Water, soup, fruit juice, and hot tea with lemon are good choices. · Take acetaminophen (such as Tylenol) to reduce a fever. It may also help with muscle aches. Read and follow all instructions on the label. · Use petroleum jelly on sore skin. This can help if the skin around your nose and lips becomes sore from rubbing a lot with tissues. Tips for self-isolation  · Limit contact with people in your home. If possible, stay in a separate bedroom and use a separate bathroom. · Wear a cloth face cover when you are around other people. It can help stop the spread of the virus when you cough or sneeze.   · If you have to leave home, avoid crowds and try to stay at least 6 feet away from other people. · Avoid contact with pets and other animals. · Cover your mouth and nose with a tissue when you cough or sneeze. Then throw it in the trash right away. · Wash your hands often, especially after you cough or sneeze. Use soap and water, and scrub for at least 20 seconds. If soap and water aren't available, use an alcohol-based hand . · Don't share personal household items. These include bedding, towels, cups and glasses, and eating utensils. · [address] in the warmest water allowed for the fabric type, and dry it completely. It's okay to wash other people's laundry with yours. · Clean and disinfect your home every day. Use household  and disinfectant wipes or sprays. Take special care to clean things that you grab with your hands. These include doorknobs, remote controls, phones, and handles on your refrigerator and microwave. And don't forget countertops, tabletops, bathrooms, and computer keyboards. When you can end self-isolation  · If you know or suspect that you have COVID-19, stay in self-isolation until:  ? You haven't had a fever for 24 hours while not taking medicines to lower the fever, and  ? Your symptoms have improved, and  ? It's been at least 10 days since your symptoms started. · Talk to your doctor about whether you also need testing, especially if you have a weakened immune system. When should you call for help? Call 911 anytime you think you may need emergency care. For example, call if you have life-threatening symptoms, such as:    · You have severe trouble breathing. (You can't talk at all.)     · You have constant chest pain or pressure.     · You are severely dizzy or lightheaded.     · You are confused or can't think clearly.     · Your face and lips have a blue color.     · You pass out (lose consciousness) or are very hard to wake up.    Call your doctor now or seek immediate medical care if:    · You have moderate trouble breathing. (You can't speak a full sentence.)     · You are coughing up blood (more than about 1 teaspoon).     · You have signs of low blood pressure. These include feeling lightheaded; being too weak to stand; and having cold, pale, clammy skin. Watch closely for changes in your health, and be sure to contact your doctor if:    · Your symptoms get worse.     · You are not getting better as expected. Call before you go to the doctor's office. Follow their instructions. And wear a cloth face cover. Current as of: December 18, 2020               Content Version: 12.8  © 2006-2021 [employer]. Care instructions adapted under license by [employer] (which disclaims liability or warranty for this information). If you have questions about a medical condition or this instruction, always ask your healthcare professional. [address] any warranty or liability for your use of this information.

## 2021-11-08 NOTE — PROGRESS NOTES
Oxygen Qualifier       Room air: SpO2 with O2 and liter flow   Resting SpO2  93%  NA   Ambulating SpO2  90%  NA       Completed by:    Omega Francisco, RT

## 2021-11-08 NOTE — PROGRESS NOTES
Pt is alert and oriented x4, resting comfortably in bed on 4L NC. RR are even and unlabored, no apparent distress noted. Pt denies pain at this time. IS at bedside, pt independently uses. Safety measures in place, call light within reach. Pt instructed to call for help if needed. Will continue to monitor.

## 2021-11-08 NOTE — ROUTINE PROCESS
Discharge home per Dr Shawnee Rangel. O2 sat on RA  continues to be 92-94%. IV removed with cath tip intact and pressure dressing applied. He had no home meds or valuable to return. He and his wife ( via phone ) both verbalized understanding of all discharge and medication instructions and copy given to him. Rxs were faxed to his pharmacy by system. Currently awaiting ride home.

## 2021-11-08 NOTE — DISCHARGE SUMMARY
Hospitalist Discharge Summary   Admit Date:  2021  9:06 AM   DC Note date: 2021  Name:  Liz Waddell   Age:  65 y.o.   Sex:  male  :  1950   MRN:  [id number]   Room:  [facility]  PCP:  Liliana Bryson MD    Presenting Complaint: Positive For Covid-19    Initial Admission Diagnosis: Acute hypoxemic respiratory failure due to COVID-19 ([facility] [address] [U07.1, J96.01]  Sepsis due to COVID-19 ([facility] [address] [U07.1, A41.89]     Problem List for this Hospitalization:  Hospital Problems as of 2021 Date Reviewed: 2021          Codes Class Noted - Resolved POA    * (Principal) RESOLVED: Acute hypoxemic respiratory failure due to COVID-19 [facility]) ICD-10-CM: U07.1, J96.01  ICD-9-CM: 518.81, 079.89, 799.02  2021 - 2021 Yes        RESOLVED: Sepsis due to COVID-19 [facility]) ICD-10-CM: U07.1, A41.89  ICD-9-CM: 079.89, 995.91  2021 - 2021 Yes        COVID-19 vaccine first dose not administered ICD-10-CM: Z28.9  ICD-9-CM: V64.00  2021 - Present Yes        Body mass index (BMI) of 26.0 to 26.9 in adult ICD-10-CM: Z68.26  ICD-9-CM: V85.22  2021 - Present Yes        Hypothyroidism (Chronic) ICD-10-CM: E03.9  ICD-9-CM: 244.9  2021 - Present Yes        RESOLVED: Elevated transaminase level ICD-10-CM: R74.01  ICD-9-CM: 790.4  2021 - 2021 Yes            Did Patient have Sepsis (YES OR NO): Yes    Hospital Course:  70M PMhx ocular melanoma, coronary artery disease s/p CABG, hypertension admitted  with acute respiratory failure secondary to COVID-19.  His onset of symptoms was 10/22.  He took ivermectin as an outpatient. [facility] had not had the Covid vaccine.  [facility]  [facility] given [facility] steroids and antibiotics (he thinks just steroids).  He did not receive tocilizumab.  His symptoms continued to worsen to where he was satting in the 80s on 6 L.  His family insisted on his return to the hospital where he was found to be acutely hypoxic and remaining Covid positive. He reported acute shortness of breath and cough.  He denied any secondary symptoms including headache, nausea, vomiting, peripheral edema, changes in urine or changes in stool. He did not have a recent history of cancer. Valeria Grier had no history of blood clots. Medical management was initiated with dexamethasone bursitis and hip. He was provided with supplemental oxygen. He worked with PT and OT daily. He was able to wean his oxygen on 11/8 and as such was determined to be safe for discharge home to follow-up with primary care within 7 days. Primary care provider may consider the following:  -Medication changes as noted below  -Adherence to CDC guidelines    Disposition: Home or Self Care  Diet: ADULT DIET Regular; doesn't like white milk, please send chocolate milk in place. ADULT ORAL NUTRITION SUPPLEMENT Breakfast, Lunch, Dinner; Standard High Calorie/High Protein  Code Status: Full Code    Follow Up Orders: Follow-up Appointments   Procedures    FOLLOW UP VISIT Appointment in: One Week Follow-up with primary care provider within 1 week     Follow-up with primary care provider within 1 week     Standing Status:   Standing     Number of Occurrences:   1     Order Specific Question:   Appointment in     Answer: One Week       Follow-up Information     Follow up With Specialties Details Why Contact Info    [phone] [employer]   [address] [phone]            Time spent in patient discharge and coordination 44 minutes. Plan was discussed with patient, respiratory therapist, nurse, . All questions answered. Patient was stable at time of discharge. Instructions given to call a physician or return if any concerns.     Discharge Info:   Current Discharge Medication List      START taking these medications    Details   baricitinib (OLUMIANT) 2 mg tablet Take 2 Tablets by mouth every twenty-four (24) hours for 11 doses. Qty: 22 Tablet, Refills: 0  Start date: 11/8/2021, End date: 11/19/2021      dexAMETHasone (DECADRON) 6 mg tablet Take 1 Tablet by mouth daily for 3 days. Qty: 3 Tablet, Refills: 0  Start date: 11/9/2021, End date: 11/12/2021         CONTINUE these medications which have NOT CHANGED    Details   budesonide-formoteroL (SYMBICORT) 160-4.5 mcg/actuation HFAA Take 2 Puffs by inhalation. acetaminophen (TYLENOL) 500 mg tablet Take 500 mg by mouth every six (6) hours as needed for Pain. finasteride (PROSCAR) 5 mg tablet Take 5 mg by mouth daily. On Monday, Wednesday, Friday. cholecalciferol (Vitamin D3) (1000 Units /25 mcg) tablet Take 5,000 Units by mouth daily. b complex vitamins tablet Take 1 Tab by mouth daily. melatonin 3 mg tablet Take 3 mg by mouth daily as needed for Sleep.      levothyroxine (SYNTHROID) 25 mcg tablet Take 25 mcg by mouth Daily (before breakfast). NATURE-THROID 48.75 mg tab TAKE 1 TABLET BY MOUTH ONCE DAILY IN THE MORNING      !! OTHER 0.5 mL by IntraMUSCular route. Testosterone injection every 2 weeks      anastrozole (ARIMIDEX) 1 mg tablet Take 0.5 mg by mouth Every Friday. magnesium oxide (MAG-OX) 400 mg tablet Take 400 mg by mouth daily. !! OTHER Take 1,000 mg by mouth daily. Folate      benzonatate (TESSALON) 200 mg capsule Take 200 mg by mouth three (3) times daily as needed for Cough. !! - Potential duplicate medications found. Please discuss with provider. Procedures done this admission:  * No surgery found *    Consults this admission:  None    Echocardiogram/EKG results:  No results found for this or any previous visit.        EKG Results     Procedure 720 Value Units Date/Time    EKG [[id number]]     Order Status: Completed           Diagnostic Imaging/Tests:   XR CHEST PORT    Result Date: 11/5/2021  Portable chest x-ray CLINICAL INDICATION: Hypoxia, Covid 19 positive FINDINGS: Single AP view the chest submitted without comparison shows bilateral peripheral airspace opacities in the midlungs and at the lung bases. There is no pleural effusion. The cardiac silhouette and mediastinum are unremarkable. There is no pneumothorax. Bilateral atypical pneumonia. All Micro Results     Procedure Component Value Units Date/Time    CULTURE, BLOOD [[id number]] Collected: 11/07/21 1458    Order Status: Completed Specimen: Blood Updated: 11/08/21 0654     Special Requests: --        RIGHT  Antecubital       Culture result: NO GROWTH AFTER 15 HOURS       CULTURE, BLOOD [[id number]] Collected: 11/07/21 1534    Order Status: Completed Specimen: Blood Updated: 11/08/21 0654     Special Requests: --        RIGHT  Antecubital       Culture result: NO GROWTH AFTER 13 HOURS       CULTURE, BLOOD #1 [[id number]] Collected: 11/05/21 1156    Order Status: Completed Specimen: Blood Updated: 11/08/21 0654     Special Requests: --        RIGHT  Antecubital       Culture result: NO GROWTH 3 DAYS       DREW Cherry, UR/CSF [[id number]] Collected: 11/05/21 2057    Order Status: Completed Specimen: Miscellaneous sample Updated: 11/07/21 1535     Source URINE        Specimen Urine     Streptococcus pneumoniae Ag Negative        Fluid culture Not indicated. Organism ID Not indicated. Please note Comment        Comment: (NOTE)  College of American Pathologists standards require a culture to be  performed on CSF specimens submitted for bacterial antigen testing. (CAP [id number]) Urine specimens will not be cultured.   Performed At: [facility] & [address] [id number]  Kendall Marin MD CP:[id number]         Hardy Bueno [[id number]] Collected: 11/05/21 2057    Order Status: Completed Specimen: Urine, random Updated: 11/07/21 1535     Source URINE        L pneumophila S1 Ag, urine Negative        Comment: (NOTE)  Presumptive negative for L. pneumophila serogroup 1 antigen in urine,  suggesting no recent or current infection. Legionnaires' disease  cannot be ruled out since other serogroups and species may also  cause disease. Performed At: [address] [id number]  Toro Kamara MD RY:[id number]         BLOOD CULTURE ID PANEL [[id number]]  (Abnormal) Collected: 11/05/21 1158    Order Status: Completed Specimen: Blood Updated: 11/07/21 0704     Acc. no. from Micro Order [id number]     Staphylococcus Detected        Comment: RESULTS VERIFIED, PHONED TO AND READ BACK BY  KAMINI TEJEDA AT 1640 ON [id number] MT          mecA (Methicillin-Resistance Genes) Detected        Comment: Presence of mecA is highly indicative of methicillin resistance. The test does not replace traditional culture and susceptibilities        INTERPRETATION       Gram positive cocci in clusters. Identified by realtime PCR as  Coagulase negative Staphylococci           Comment: A single positive culture of coagulase negative Staph is likely to be a contaminant in adult patients. Consider discontinuation of antibiotics for gram positive bloodstream infections if patient asymptomatic. THIS TEST DOES NOT REPLACE SENSITIVITY TESTING. CULTURE, BLOOD #2 [[id number]]  (Abnormal) Collected: 11/05/21 1158    Order Status: Completed Specimen: Blood Updated: 11/07/21 0648     Special Requests: --        LEFT  HAND       GRAM STAIN       GRAM POS COCCI IN CLUSTERS            AEROBIC BOTTLE POSITIVE               RESULTS VERIFIED, PHONED TO AND READ BACK BY KAMINI TEJEDA AT 1640 ON [id number]           Culture result:       STAPHYLOCOCCUS SPECIES, COAGULASE NEGATIVE This organism may be indicative of culture contamination. Clinical correlation needs to be evaluated as each case is unique.                   Refer to Blood Culture ID Panel Accession  [id number]            Labs: Results:       BMP, Mg, Phos Recent Labs     11/08/21  0647 11/07/21  0703 11/06/21  0551    140 138   K 3.8 3.5 3.9    104 105   CO2 28 30 28 AGAP 5* 6* 5*   BUN 20 15 16   CREA 0.73* 0.77* 0.72*   CA 8.7 8.4 8.4   GLU 95 85 101*   MG 2.2 2.0 2.2      CBC Recent Labs     11/08/21  0647 11/07/21  0659 11/06/21  0551   WBC 4.7 5.0 5.1   RBC 4.56 4.51 4.65   HGB 14.4 14.2 14.2   HCT 42.6 42.6 42.6    178 212      LFT Recent Labs     11/07/21  0703   ALT 60   AP 60   TP 5.8*   ALB 2.2*   GLOB 3.6*   AGRAT 0.6*      Cardiac Testing No results found for: BNPP, BNP, CPK, RCK1, RCK2, RCK3, RCK4, CKMB, CKNDX, CKND1, TROPT, TROIQ   Coagulation Tests Lab Results   Component Value Date/Time    Prothrombin time 13.1 06/16/2020 10:32 AM    INR 1.0 06/16/2020 10:32 AM    aPTT 29.5 06/16/2020 10:32 AM      A1c Lab Results   Component Value Date/Time    Hemoglobin A1c 5.3 06/16/2020 10:32 AM      Lipid Panel No results found for: CHOL, CHOLPOCT, CHOLX, CHLST, CHOLV, 043901, HDL, HDLP, LDL, LDLC, DLDLP, 774494, VLDLC, VLDL, TGLX, TRIGL, TRIGP, TGLPOCT, CHHD, CHHDX   Thyroid Panel No results found for: TSH, T4, FT4, TT3, T3U, TSHEXT     Most Recent UA No results found for: COLOR, APPRN, REFSG, BEV, PROTU, GLUCU, KETU, BILU, BLDU, UROU, LAMBERTO, LEUKU, WBCU, RBCU, UEPI, BACTU, CASTS, UCRY, MUCUS, UCOM       All Labs from Last 24 Hrs:  Recent Results (from the past 24 hour(s))   CULTURE, BLOOD    Collection Time: 11/07/21  2:58 PM    Specimen: Blood   Result Value Ref Range    Special Requests: RIGHT  Antecubital        Culture result: NO GROWTH AFTER 15 HOURS     LACTIC ACID    Collection Time: 11/07/21  2:58 PM   Result Value Ref Range    Lactic acid 1.7 0.4 - 2.0 MMOL/L   CULTURE, BLOOD    Collection Time: 11/07/21  3:34 PM    Specimen: Blood   Result Value Ref Range    Special Requests: RIGHT  Antecubital        Culture result: NO GROWTH AFTER 13 HOURS     C REACTIVE PROTEIN, QT    Collection Time: 11/08/21  6:47 AM   Result Value Ref Range    C-Reactive protein 5.4 (H) 0.0 - 0.9 mg/dL   MAGNESIUM    Collection Time: 11/08/21  6:47 AM   Result Value Ref Range Magnesium 2.2 1.8 - 2.4 mg/dL   CBC W/O DIFF    Collection Time: 11/08/21  6:47 AM   Result Value Ref Range    WBC 4.7 4.3 - 11.1 K/uL    RBC 4.56 4.23 - 5.6 M/uL    HGB 14.4 13.6 - 17.2 g/dL    HCT 42.6 41.1 - 50.3 %    MCV 93.4 79.6 - 97.8 FL    MCH 31.6 26.1 - 32.9 PG    MCHC 33.8 31.4 - 35.0 g/dL    RDW 12.7 11.9 - 14.6 %    PLATELET 511 241 - 527 K/uL    MPV 10.9 9.4 - 12.3 FL    ABSOLUTE NRBC 0.00 0.0 - 0.2 K/uL   METABOLIC PANEL, BASIC    Collection Time: 11/08/21  6:47 AM   Result Value Ref Range    Sodium 137 136 - 145 mmol/L    Potassium 3.8 3.5 - 5.1 mmol/L    Chloride 104 98 - 107 mmol/L    CO2 28 21 - 32 mmol/L    Anion gap 5 (L) 7 - 16 mmol/L    Glucose 95 65 - 100 mg/dL    BUN 20 8 - 23 MG/DL    Creatinine 0.73 (L) 0.8 - 1.5 MG/DL    GFR est AA >60 >60 ml/min/1.73m2    GFR est non-AA >60 >60 ml/min/1.73m2    Calcium 8.7 8.3 - 10.4 MG/DL       Current Med List in Hospital:   Current Facility-Administered Medications   Medication Dose Route Frequency    [START ON 11/9/2021] dexAMETHasone (DECADRON) tablet 6 mg  6 mg Oral DAILY    sodium chloride (NS) flush 5-10 mL  5-10 mL IntraVENous PRN    sodium chloride (NS) flush 5-40 mL  5-40 mL IntraVENous Q8H    sodium chloride (NS) flush 5-40 mL  5-40 mL IntraVENous PRN    acetaminophen (TYLENOL) tablet 650 mg  650 mg Oral Q6H PRN    Or    acetaminophen (TYLENOL) suppository 650 mg  650 mg Rectal Q6H PRN    polyethylene glycol (MIRALAX) packet 17 g  17 g Oral DAILY PRN    ondansetron (ZOFRAN ODT) tablet 4 mg  4 mg Oral Q8H PRN    Or    ondansetron (ZOFRAN) injection 4 mg  4 mg IntraVENous Q6H PRN    enoxaparin (LOVENOX) injection 30 mg  30 mg SubCUTAneous Q12H    albuterol-ipratropium (DUO-NEB) 2.5 MG-0.5 MG/3 ML  3 mL Nebulization Q4H PRN    budesonide-formoteroL (SYMBICORT) 160-4.5 mcg/actuation HFA inhaler 2 Puff  2 Puff Inhalation BID RT    benzonatate (TESSALON) capsule 200 mg  200 mg Oral TID PRN    levothyroxine (SYNTHROID) tablet 25 mcg 25 mcg Oral ACB    melatonin tablet 3 mg  3 mg Oral DAILY PRN    calcium-vitamin D (OS-ELHAM +D3) 500 mg-200 unit per tablet 1 Tablet  1 Tablet Oral DAILY WITH BREAKFAST    baricitinib (OLUMIANT) tablet 4 mg  4 mg Oral Q24H       No Known Allergies  Immunization History   Administered Date(s) Administered    TB Skin Test (PPD) Intradermal 11/05/2021       Recent Vital Data:  Patient Vitals for the past 24 hrs:   Temp Pulse Resp BP SpO2   11/08/21 1149 97.6 °F (36.4 °C) 70 20 (!) 111/55 90 %   11/08/21 0735     97 %   11/08/21 0653 97.9 °F (36.6 °C) (!) 52 18 (!) 147/80 95 %   11/08/21 0319 98 °F (36.7 °C) (!) 54 16 119/87 97 %   11/07/21 2314 97.8 °F (36.6 °C) (!) 57 18 (!) 147/87 97 %   11/07/21 2002     91 %   11/07/21 1934 98 °F (36.7 °C) 68 19 125/81 93 %   11/07/21 1524 97.9 °F (36.6 °C) 71 18 120/66 92 %     Oxygen Therapy  O2 Sat (%): 90 % (11/08/21 1149)  Pulse via Oximetry: 74 beats per minute (11/08/21 0735)  O2 Device: Nasal cannula (11/08/21 0800)  Skin Assessment: Clean, dry, & intact (11/08/21 0463)  Skin Protection for O2 Device: No (11/08/21 0637)  O2 Flow Rate (L/min): 4 l/min (11/08/21 0800)    Estimated body mass index is 26.65 kg/m² as calculated from the following:    Height as of this encounter: 6' (1.829 m). Weight as of this encounter: 89.1 kg (196 lb 8 oz). Intake/Output Summary (Last 24 hours) at 11/8/2021 1451  Last data filed at 11/8/2021 1348  Gross per 24 hour   Intake 1080 ml   Output    Net 1080 ml       Physical Exam  Vitals and nursing note reviewed. Constitutional:       General: He is not in acute distress. Appearance: Normal appearance. Comments: Much more interactive than on prior days   HENT:      Head: Normocephalic. Eyes:      Extraocular Movements: Extraocular movements intact. Comments: L enucleation with prosthesis   Cardiovascular:      Rate and Rhythm: Normal rate. Pulses:           Radial pulses are 2+ on the left side.       Heart sounds: Normal heart sounds. Pulmonary:      Effort: Pulmonary effort is normal. No respiratory distress. Breath sounds: No wheezing or rhonchi. Abdominal:      General: Bowel sounds are normal.      Tenderness: There is no abdominal tenderness. Musculoskeletal:         General: No deformity. Cervical back: No rigidity. Right lower leg: No edema. Left lower leg: No edema. Skin:     General: Skin is warm and dry. Neurological:      General: No focal deficit present. Mental Status: He is alert and oriented to person, place, and time. Psychiatric:         Mood and Affect: Mood normal. Affect is flat.          Behavior: Behavior normal.         Signed:  Remedios Artis MD

## 2021-11-08 NOTE — ROUTINE PROCESS
Shift report received from Maurice, [address] [place] [address]. Patient resting in bed with no distress on 4L. SR up x2 with call light within reach. Bed low locked and Covid isolation in place.

## 2021-11-08 NOTE — PROGRESS NOTES
RNCM review and discussed chart in IDR. Patient currently on 4LNC. PT/OT recommending home with home health when stable. CM will continue to follow.

## 2021-11-08 NOTE — PROGRESS NOTES
Physician Progress Note      Kush Brownlee  [facility] #:                  [id number]  :                       1950  ADMIT DATE:       2021 9:06 AM  DISCH DATE:  RESPONDING  PROVIDER #:        Massimo Ortega MD          QUERY TEXT:    Patient admitted with COVID 19. Noted documentation of sepsis in  H&P. In order to support the diagnosis of sepsis, please include additional clinical indicators in your documentation. Or please document if the diagnosis of sepsis has been ruled out after further study. The medical record reflects the following:  Risk Factors: COVID 19/bacterial infection  Clinical Indicators: COVID 19, WBC 4.2 on admission only, RR 22, Procalcitonin negative, CRP 1.3, afebrile on admission and after. Treatment: Decadron IV, Vancomycin IV        Thank you. Lory De Luna RN CDI, CCS  My office phone [phone]  Options provided:  -- Sepsis present as evidenced by, Please document evidence.   -- Sepsis was ruled out after study  -- Other - I will add my own diagnosis  -- Disagree - Not applicable / Not valid  -- Disagree - Clinically unable to determine / Unknown  -- Refer to Clinical Documentation Reviewer    PROVIDER RESPONSE TEXT:    Sepsis is present as evidenced by RR 22, respiratory failure    Query created by: Gonzalez Caruso on 2021 3:17 PM      Electronically signed by:  Massimo Ortega MD 2021 3:25 PM

## 2021-11-09 LAB
M PNEUMO IGG SER IA-ACNC: 174 U/ML (ref 0–99)
M PNEUMO IGM SER IA-ACNC: <770 U/ML (ref 0–769)

## 2021-11-10 LAB
BACTERIA SPEC CULT: NORMAL
SERVICE CMNT-IMP: NORMAL

## 2021-11-12 LAB
BACTERIA SPEC CULT: NORMAL
BACTERIA SPEC CULT: NORMAL
SERVICE CMNT-IMP: NORMAL
SERVICE CMNT-IMP: NORMAL

## 2022-03-18 PROBLEM — E78.00 PURE HYPERCHOLESTEROLEMIA: Status: ACTIVE | Noted: 2019-12-24

## 2022-03-18 PROBLEM — I10 ESSENTIAL HYPERTENSION: Status: ACTIVE | Noted: 2019-12-24

## 2022-03-18 PROBLEM — Z96.641 STATUS POST RIGHT HIP REPLACEMENT: Status: ACTIVE | Noted: 2020-07-06

## 2022-03-18 PROBLEM — R00.1 BRADYCARDIA: Status: ACTIVE | Noted: 2019-12-24

## 2022-03-19 PROBLEM — M16.11 PRIMARY LOCALIZED OSTEOARTHRITIS OF RIGHT HIP: Status: ACTIVE | Noted: 2020-07-07

## 2022-03-19 PROBLEM — M16.11 OSTEOARTHRITIS OF RIGHT HIP: Status: ACTIVE | Noted: 2020-07-06

## 2022-03-19 PROBLEM — M16.11 PRIMARY OSTEOARTHRITIS OF RIGHT HIP: Status: ACTIVE | Noted: 2020-07-06

## 2022-03-19 PROBLEM — E03.9 HYPOTHYROIDISM: Status: ACTIVE | Noted: 2021-11-01

## 2022-03-19 PROBLEM — I25.10 CORONARY ARTERY DISEASE INVOLVING NATIVE CORONARY ARTERY OF NATIVE HEART WITHOUT ANGINA PECTORIS: Status: ACTIVE | Noted: 2019-12-24

## 2022-03-20 PROBLEM — Z28.310 COVID-19 VACCINE FIRST DOSE NOT ADMINISTERED: Status: ACTIVE | Noted: 2021-11-05

## 2022-03-20 PROBLEM — Z28.9 COVID-19 VACCINE FIRST DOSE NOT ADMINISTERED: Status: ACTIVE | Noted: 2021-11-05

## 2022-07-07 ENCOUNTER — OFFICE VISIT (OUTPATIENT)
Dept: CARDIOLOGY CLINIC | Age: 72
End: 2022-07-07
Payer: MEDICARE

## 2022-07-07 VITALS
BODY MASS INDEX: 27.33 KG/M2 | WEIGHT: 201.8 LBS | HEART RATE: 44 BPM | DIASTOLIC BLOOD PRESSURE: 80 MMHG | SYSTOLIC BLOOD PRESSURE: 136 MMHG | HEIGHT: 72 IN

## 2022-07-07 DIAGNOSIS — R00.1 BRADYCARDIA: ICD-10-CM

## 2022-07-07 DIAGNOSIS — I10 ESSENTIAL HYPERTENSION: ICD-10-CM

## 2022-07-07 DIAGNOSIS — I25.10 CORONARY ARTERY DISEASE INVOLVING NATIVE CORONARY ARTERY OF NATIVE HEART WITHOUT ANGINA PECTORIS: Primary | ICD-10-CM

## 2022-07-07 DIAGNOSIS — E78.00 PURE HYPERCHOLESTEROLEMIA: ICD-10-CM

## 2022-07-07 PROCEDURE — 1123F ACP DISCUSS/DSCN MKR DOCD: CPT | Performed by: INTERNAL MEDICINE

## 2022-07-07 PROCEDURE — 1036F TOBACCO NON-USER: CPT | Performed by: INTERNAL MEDICINE

## 2022-07-07 PROCEDURE — 3017F COLORECTAL CA SCREEN DOC REV: CPT | Performed by: INTERNAL MEDICINE

## 2022-07-07 PROCEDURE — G8427 DOCREV CUR MEDS BY ELIG CLIN: HCPCS | Performed by: INTERNAL MEDICINE

## 2022-07-07 PROCEDURE — G8417 CALC BMI ABV UP PARAM F/U: HCPCS | Performed by: INTERNAL MEDICINE

## 2022-07-07 PROCEDURE — 93000 ELECTROCARDIOGRAM COMPLETE: CPT | Performed by: INTERNAL MEDICINE

## 2022-07-07 PROCEDURE — 99214 OFFICE O/P EST MOD 30 MIN: CPT | Performed by: INTERNAL MEDICINE

## 2022-07-07 RX ORDER — CYANOCOBALAMIN 1000 UG/ML
1000 INJECTION INTRAMUSCULAR; SUBCUTANEOUS WEEKLY
COMMUNITY

## 2022-07-07 NOTE — PROGRESS NOTES
[address], [address]  PHONE: [phone]     22    NAME:  Tyler Arevalo  : 1950  MRN: [id number]       SUBJECTIVE:   Tyler Arevalo is a 66 y.o. male seen for a follow up visit regarding the following:     Chief Complaint   Patient presents with    Coronary Artery Disease     6 mo f/u     Hypertension       HPI:  Here for CAD. He has CAD, PCI in  in [facility].     NST 2020: low risk, normal.    Echo 2020: normal EF, mild MR   Monitor 2020: sinus evelia, avg HR 60, PVCs     HR low, asx now. HR 50s at home, feeling well. Back walking now, feeling better now. mowing grass, no angina. HR low, asx. Home SBP 120s. .    Patient denies recent history of orthopnea, PND, excessive dizziness and/or syncope. Still getting chelation in [place]. Had left eye removed in  for melanoma. Wife likes preventive measures. Past Medical History, Past Surgical History, Family history, Social History, and Medications were all reviewed with the patient today and updated as necessary.      Current Outpatient Medications   Medication Sig Dispense Refill    cyanocobalamin 1000 MCG/ML injection Inject 1,000 mcg into the muscle once a week      Multiple Vitamin (MULTIVITAMIN ADULT PO) Take by mouth      Coenzyme Q10 (COQ10 PO) Take by mouth      Cobalamin Combinations (B12 FOLATE PO) Take by mouth      acetaminophen (TYLENOL) 500 MG tablet Take 500 mg by mouth every 6 hours as needed      anastrozole (ARIMIDEX) 1 MG tablet Take 0.5 mg by mouth every 7 days       vitamin D (CHOLECALCIFEROL) 25 MCG (1000 UT) TABS tablet Take 5,000 Units by mouth daily      finasteride (PROSCAR) 5 MG tablet Take 5 mg by mouth three times a week       levothyroxine (SYNTHROID) 25 MCG tablet Take 25 mcg by mouth every morning (before breakfast)      magnesium oxide (MAG-OX) 400 (240 Mg) MG tablet Take 400 mg by mouth daily      melatonin 3 MG TABS tablet Take 3 mg by mouth daily as needed      benzonatate (TESSALON) 200 MG capsule Take 200 mg by mouth 3 times daily as needed      Thyroid (NATURE-THROID) 48.75 MG TABS TAKE 1 TABLET BY MOUTH ONCE DAILY IN THE MORNING       No current facility-administered medications for this visit. No Known Allergies  Patient Active Problem List    Diagnosis Date Noted    Body mass index (BMI) of 26.0 to 26.9 in adult 11/05/2021    COVID-19 vaccine first dose not administered 11/05/2021    Hypothyroidism 11/01/2021    Primary localized osteoarthritis of right hip 07/07/2020    Status post right hip replacement 07/06/2020    Primary osteoarthritis of right hip 07/06/2020    Osteoarthritis of right hip 07/06/2020    Pure hypercholesterolemia 12/24/2019    Essential hypertension 12/24/2019    Bradycardia 12/24/2019    Coronary artery disease involving native coronary artery of native heart without angina pectoris 12/24/2019      Past Surgical History:   Procedure Laterality Date    CORONARY ANGIOPLASTY WITH STENT PLACEMENT  2007    2 stents     HEENT Left 1986    left eye removed due to melanoma ; has prosthetic left eye     ORTHOPEDIC SURGERY Right 1975    ligament repair right ankle     SHOULDER ARTHROSCOPY Right 1984     Family History   Problem Relation Age of Onset    Emphysema Mother     Cancer Father     Coronary Art Dis Father     Deep Vein Thrombosis Father      Social History     Tobacco Use    Smoking status: Never Smoker    Smokeless tobacco: Never Used   Substance Use Topics    Alcohol use: Never         ROS:    No obvious pertinent positives on review of systems except for what was outlined in the HPI today.       PHYSICAL EXAM:     /80   Pulse (!) 44   Ht 6' (1.829 m)   Wt 201 lb 12.8 oz (91.5 kg)   BMI 27.37 kg/m²    General/Constitutional:   Alert and oriented x 3, no acute distress  HEENT:   normocephalic, atraumatic, moist mucous membranes  Neck:   No JVD or carotid bruits bilaterally  Cardiovascular:   regular rate and rhythm, no murmur/rub/gallop appreciated  Pulmonary:   clear to auscultation bilaterally, no respiratory distress  Abdomen:   soft, non-tender, non-distended  Ext:   No sig LE edema bilaterally  Skin:  warm and dry, no obvious rashes seen  Neuro:   no obvious sensory or motor deficits  Psychiatric:   normal mood and affect    EKG Today and independently reviewed by me: sinus rhythm/evelia, normal intervals and non-specific ST/T wave changes. Lab Results   Component Value Date/Time     11/08/2021 06:47 AM    K 3.8 11/08/2021 06:47 AM     11/08/2021 06:47 AM    CO2 28 11/08/2021 06:47 AM    BUN 20 11/08/2021 06:47 AM    CREATININE 0.73 11/08/2021 06:47 AM    GLUCOSE 95 11/08/2021 06:47 AM    CALCIUM 8.7 11/08/2021 06:47 AM        Lab Results   Component Value Date    WBC 4.7 11/08/2021    HGB 14.4 11/08/2021    HCT 42.6 11/08/2021    MCV 93.4 11/08/2021     11/08/2021       No results found for: TSHFT4, TSH    Lab Results   Component Value Date    LABA1C 5.3 06/16/2020     Lab Results   Component Value Date     06/16/2020       No results found for: CHOL  No results found for: TRIG  No results found for: HDL  No results found for: LDLCHOLESTEROL, LDLCALC  No results found for: LABVLDL, VLDL  No results found for: CHOLHDLRATIO        I have Independently reviewed prior care notes, any ER records available, cardiac testing, labs and results with the patient and before seeing the patient today. Also independently reviewed outside records when available. ASSESSMENT:    Eliezer Aceves was seen today for coronary artery disease and hypertension. Diagnoses and all orders for this visit:    Coronary artery disease involving native coronary artery of native heart without angina pectoris  -     EKG 12 Lead    Essential hypertension  -     EKG 12 Lead    Bradycardia    Pure hypercholesterolemia          PLAN:      1. Aline Portillo:  He is asx now.   Will follow. Call PRN for sx of low HR as reviewed. Follow. 2. CAD:  PCI in 2007, remain on meds. Follow for more angina. Added ASA 81. The patient has been instructed to call with any angina or equivalent as reviewed today. All questions were answered with the patient voicing complete understanding. 3. HTN:  Off meds, he and wife not liking meds. Likes natural remedies. White coat, better at home. Follow, more in the 120s. 4. HPL:  Follow, no meds for now. Labs coming up,  Working on diet and exercise. Patient has been instructed and agrees to call our office with any issues or other concerns related to their cardiac condition(s) and/or complaint(s). Return in about 6 months (around 1/7/2023).        PHYLLIS HARPER, DO  7/7/2022

## 2023-01-09 ENCOUNTER — OFFICE VISIT (OUTPATIENT)
Dept: CARDIOLOGY CLINIC | Age: 73
End: 2023-01-09
Payer: MEDICARE

## 2023-01-09 VITALS
SYSTOLIC BLOOD PRESSURE: 144 MMHG | HEART RATE: 54 BPM | DIASTOLIC BLOOD PRESSURE: 82 MMHG | BODY MASS INDEX: 28.09 KG/M2 | WEIGHT: 207.4 LBS | HEIGHT: 72 IN

## 2023-01-09 DIAGNOSIS — I25.10 CORONARY ARTERY DISEASE INVOLVING NATIVE CORONARY ARTERY OF NATIVE HEART WITHOUT ANGINA PECTORIS: Primary | ICD-10-CM

## 2023-01-09 DIAGNOSIS — I10 ESSENTIAL HYPERTENSION: ICD-10-CM

## 2023-01-09 DIAGNOSIS — R00.1 BRADYCARDIA: ICD-10-CM

## 2023-01-09 DIAGNOSIS — E78.00 PURE HYPERCHOLESTEROLEMIA: ICD-10-CM

## 2023-01-09 PROCEDURE — 99214 OFFICE O/P EST MOD 30 MIN: CPT | Performed by: INTERNAL MEDICINE

## 2023-01-09 PROCEDURE — G8484 FLU IMMUNIZE NO ADMIN: HCPCS | Performed by: INTERNAL MEDICINE

## 2023-01-09 PROCEDURE — 3077F SYST BP >= 140 MM HG: CPT | Performed by: INTERNAL MEDICINE

## 2023-01-09 PROCEDURE — G8417 CALC BMI ABV UP PARAM F/U: HCPCS | Performed by: INTERNAL MEDICINE

## 2023-01-09 PROCEDURE — 3017F COLORECTAL CA SCREEN DOC REV: CPT | Performed by: INTERNAL MEDICINE

## 2023-01-09 PROCEDURE — 1123F ACP DISCUSS/DSCN MKR DOCD: CPT | Performed by: INTERNAL MEDICINE

## 2023-01-09 PROCEDURE — 1036F TOBACCO NON-USER: CPT | Performed by: INTERNAL MEDICINE

## 2023-01-09 PROCEDURE — 3079F DIAST BP 80-89 MM HG: CPT | Performed by: INTERNAL MEDICINE

## 2023-01-09 PROCEDURE — G8428 CUR MEDS NOT DOCUMENT: HCPCS | Performed by: INTERNAL MEDICINE

## 2023-01-09 NOTE — PROGRESS NOTES
[address], [address] [employer] [facility], [address] [place] [address]  PHONE: [phone]     23    NAME:  Dorcas Sneed  : 1950  MRN: [id number]       SUBJECTIVE:   Dorcas Sneed is a 74 y.o. male seen for a follow up visit regarding the following:     Chief Complaint   Patient presents with    Coronary Artery Disease       HPI:   Here for CAD. He has CAD, PCI in  in [facility].     NST 2020: low risk, normal.    Echo 2020: normal EF, mild MR   Monitor 2020: sinus evelia, avg HR 60, PVCs     BP lower at home. No new angina, CP, RENTERIA    Patient denies recent history of orthopnea, PND, excessive dizziness and/or syncope. 2022: normal Cr and K.  TSH normal.          Still getting chelation in [place]. Had left eye removed in  for melanoma. Likes preventive measures. Past Medical History, Past Surgical History, Family history, Social History, and Medications were all reviewed with the patient today and updated as necessary.      Current Outpatient Medications   Medication Sig Dispense Refill    cyanocobalamin 1000 MCG/ML injection Inject 1,000 mcg into the muscle once a week      Multiple Vitamin (MULTIVITAMIN ADULT PO) Take by mouth      Coenzyme Q10 (COQ10 PO) Take by mouth      Cobalamin Combinations (B12 FOLATE PO) Take by mouth      acetaminophen (TYLENOL) 500 MG tablet Take 500 mg by mouth every 6 hours as needed      anastrozole (ARIMIDEX) 1 MG tablet Take 0.5 mg by mouth every 7 days       vitamin D (CHOLECALCIFEROL) 25 MCG (1000 UT) TABS tablet Take 5,000 Units by mouth daily      finasteride (PROSCAR) 5 MG tablet Take 5 mg by mouth three times a week       levothyroxine (SYNTHROID) 25 MCG tablet Take 25 mcg by mouth every morning (before breakfast)      magnesium oxide (MAG-OX) 400 (240 Mg) MG tablet Take 400 mg by mouth daily      melatonin 3 MG TABS tablet Take 3 mg by mouth at bedtime      Thyroid (NATURE-THROID) 48.75 MG TABS TAKE 1 TABLET BY MOUTH ONCE DAILY IN THE MORNING      benzonatate (TESSALON) 200 MG capsule Take 200 mg by mouth 3 times daily as needed       No current facility-administered medications for this visit. No Known Allergies  Patient Active Problem List    Diagnosis Date Noted    Body mass index (BMI) of 26.0 to 26.9 in adult 11/05/2021    COVID-19 vaccine first dose not administered 11/05/2021    Hypothyroidism 11/01/2021    Primary localized osteoarthritis of right hip 07/07/2020    Status post right hip replacement 07/06/2020    Primary osteoarthritis of right hip 07/06/2020    Osteoarthritis of right hip 07/06/2020    Pure hypercholesterolemia 12/24/2019    Essential hypertension 12/24/2019    Bradycardia 12/24/2019    Coronary artery disease involving native coronary artery of native heart without angina pectoris 12/24/2019      Past Surgical History:   Procedure Laterality Date    CORONARY ANGIOPLASTY WITH STENT PLACEMENT  2007    2 stents     HEENT Left 1986    left eye removed due to melanoma ; has prosthetic left eye     ORTHOPEDIC SURGERY Right 1975    ligament repair right ankle     SHOULDER ARTHROSCOPY Right 1984     Family History   Problem Relation Age of Onset    Emphysema Mother     Cancer Father     Coronary Art Dis Father     Deep Vein Thrombosis Father      Social History     Tobacco Use    Smoking status: Never    Smokeless tobacco: Never   Substance Use Topics    Alcohol use: Never         ROS:    No obvious pertinent positives on review of systems except for what was outlined in the HPI today.       PHYSICAL EXAM:     BP (!) 144/82   Pulse 54   Ht 6' (1.829 m)   Wt 207 lb 6.4 oz (94.1 kg)   BMI 28.13 kg/m²    General/Constitutional:   Alert and oriented x 3, no acute distress  HEENT:   normocephalic, atraumatic, moist mucous membranes  Neck:   No JVD or carotid bruits bilaterally  Cardiovascular:   regular rate and rhythm, no murmur/rub/gallop appreciated  Pulmonary:   clear to auscultation bilaterally, no respiratory distress  Abdomen:   soft, non-tender, non-distended  Ext:   No sig LE edema bilaterally  Skin:  warm and dry, no obvious rashes seen  Neuro:   no obvious sensory or motor deficits  Psychiatric:   normal mood and affect      Lab Results   Component Value Date/Time     11/08/2021 06:47 AM    K 3.8 11/08/2021 06:47 AM     11/08/2021 06:47 AM    CO2 28 11/08/2021 06:47 AM    BUN 20 11/08/2021 06:47 AM    CREATININE 0.73 11/08/2021 06:47 AM    GLUCOSE 95 11/08/2021 06:47 AM    CALCIUM 8.7 11/08/2021 06:47 AM        Lab Results   Component Value Date    WBC 4.7 11/08/2021    HGB 14.4 11/08/2021    HCT 42.6 11/08/2021    MCV 93.4 11/08/2021     11/08/2021       No results found for: TSHFT4, TSH    Lab Results   Component Value Date    LABA1C 5.3 06/16/2020     Lab Results   Component Value Date     06/16/2020       No results found for: CHOL  No results found for: TRIG  No results found for: HDL  No results found for: LDLCHOLESTEROL, LDLCALC  No results found for: LABVLDL, VLDL  No results found for: CHOLHDLRATIO        I have Independently reviewed prior care notes, any ER records available, cardiac testing, labs and results with the patient and before seeing the patient today. Also independently reviewed outside records when available. ASSESSMENT:    Dave Chávez was seen today for coronary artery disease. Diagnoses and all orders for this visit:    Coronary artery disease involving native coronary artery of native heart without angina pectoris    Essential hypertension    Bradycardia    Pure hypercholesterolemia        PLAN:         1. Delaney Shelby:  He is asx now. Will follow. 2. CAD:  PCI in 2007, remain on meds. Follow for more angina. On ASA 81 daily        The patient has been instructed to call with any angina or equivalent as reviewed today. All questions were answered with the patient voicing complete understanding. 3. HTN:  Off meds. Likes natural remedies.    White coat, better at home. Follow, more in the 120s. 4. HPL:  Follow, no meds for now. Working on diet and exercise. Patient has been instructed and agrees to call our office with any issues or other concerns related to their cardiac condition(s) and/or complaint(s). Return in about 6 months (around 7/9/2023).        PHYLLIS HARPER, DO  1/9/2023

## 2023-03-09 NOTE — PROGRESS NOTES
BESSIE from [facility], [facility]. Patient in stable condition with resps even/unlabored. NAD noted. Patient on oxygen to nasal cannula. Safety measures noted. Will continue to monitor per policy. Hypertension

## 2023-07-10 ENCOUNTER — OFFICE VISIT (OUTPATIENT)
Age: 73
End: 2023-07-10
Payer: MEDICARE

## 2023-07-10 VITALS
HEIGHT: 72 IN | BODY MASS INDEX: 27.9 KG/M2 | HEART RATE: 47 BPM | WEIGHT: 206 LBS | SYSTOLIC BLOOD PRESSURE: 122 MMHG | DIASTOLIC BLOOD PRESSURE: 80 MMHG

## 2023-07-10 DIAGNOSIS — R00.1 BRADYCARDIA: ICD-10-CM

## 2023-07-10 DIAGNOSIS — I25.10 CORONARY ARTERY DISEASE INVOLVING NATIVE CORONARY ARTERY OF NATIVE HEART WITHOUT ANGINA PECTORIS: Primary | ICD-10-CM

## 2023-07-10 DIAGNOSIS — E78.00 PURE HYPERCHOLESTEROLEMIA: ICD-10-CM

## 2023-07-10 DIAGNOSIS — I10 ESSENTIAL HYPERTENSION: ICD-10-CM

## 2023-07-10 PROCEDURE — 1036F TOBACCO NON-USER: CPT | Performed by: INTERNAL MEDICINE

## 2023-07-10 PROCEDURE — 93000 ELECTROCARDIOGRAM COMPLETE: CPT | Performed by: INTERNAL MEDICINE

## 2023-07-10 PROCEDURE — G8417 CALC BMI ABV UP PARAM F/U: HCPCS | Performed by: INTERNAL MEDICINE

## 2023-07-10 PROCEDURE — 3017F COLORECTAL CA SCREEN DOC REV: CPT | Performed by: INTERNAL MEDICINE

## 2023-07-10 PROCEDURE — G8428 CUR MEDS NOT DOCUMENT: HCPCS | Performed by: INTERNAL MEDICINE

## 2023-07-10 PROCEDURE — 3074F SYST BP LT 130 MM HG: CPT | Performed by: INTERNAL MEDICINE

## 2023-07-10 PROCEDURE — 99214 OFFICE O/P EST MOD 30 MIN: CPT | Performed by: INTERNAL MEDICINE

## 2023-07-10 PROCEDURE — 1123F ACP DISCUSS/DSCN MKR DOCD: CPT | Performed by: INTERNAL MEDICINE

## 2023-07-10 PROCEDURE — 3079F DIAST BP 80-89 MM HG: CPT | Performed by: INTERNAL MEDICINE

## 2023-07-10 NOTE — PROGRESS NOTES
[address] [facility], [facility], [place]  PHONE: [phone]     07/10/23    NAME:  Kayli Ricardo  : 1950  MRN: [id number]       SUBJECTIVE:   Kayli Ricardo is a 74 y.o. male seen for a follow up visit regarding the following:     Chief Complaint   Patient presents with    Coronary Artery Disease       HPI:   Here for CAD. He has CAD, PCI in  in [facility].     NST 2020: low risk, normal.    Echo 2020: normal EF, mild MR   Monitor 2020: sinus evelia, avg HR 60, PVCs     BP lower at home. No new angina, CP, RENTERIA   HR low, but feeling well. Patient denies recent history of orthopnea, PND, excessive dizziness and/or syncope. Still getting chelation in [place]. Had left eye removed in  for melanoma. Likes preventive measures. Past Medical History, Past Surgical History, Family history, Social History, and Medications were all reviewed with the patient today and updated as necessary.      Current Outpatient Medications   Medication Sig Dispense Refill    cyanocobalamin 1000 MCG/ML injection Inject 1 mL into the muscle once a week      Multiple Vitamin (MULTIVITAMIN ADULT PO) Take by mouth      Coenzyme Q10 (COQ10 PO) Take by mouth      Cobalamin Combinations (B12 FOLATE PO) Take by mouth      acetaminophen (TYLENOL) 500 MG tablet Take 1 tablet by mouth every 6 hours as needed      anastrozole (ARIMIDEX) 1 MG tablet Take 0.5 tablets by mouth every 7 days      benzonatate (TESSALON) 200 MG capsule Take 1 capsule by mouth 3 times daily as needed      vitamin D (CHOLECALCIFEROL) 25 MCG (1000 UT) TABS tablet Take 5 tablets by mouth daily      finasteride (PROSCAR) 5 MG tablet Take 1 tablet by mouth three times a week      levothyroxine (SYNTHROID) 25 MCG tablet Take 1 tablet by mouth every morning (before breakfast)      magnesium oxide (MAG-OX) 400 (240 Mg) MG tablet Take 1 tablet by mouth daily      melatonin 3 MG TABS tablet Take 1 tablet by mouth at

## 2024-01-11 ENCOUNTER — OFFICE VISIT (OUTPATIENT)
Age: 74
End: 2024-01-11
Payer: MEDICARE

## 2024-01-11 VITALS
HEIGHT: 72 IN | BODY MASS INDEX: 28.44 KG/M2 | SYSTOLIC BLOOD PRESSURE: 146 MMHG | DIASTOLIC BLOOD PRESSURE: 84 MMHG | WEIGHT: 210 LBS | HEART RATE: 60 BPM

## 2024-01-11 DIAGNOSIS — I10 ESSENTIAL HYPERTENSION: ICD-10-CM

## 2024-01-11 DIAGNOSIS — E78.00 PURE HYPERCHOLESTEROLEMIA: ICD-10-CM

## 2024-01-11 DIAGNOSIS — I25.10 CORONARY ARTERY DISEASE INVOLVING NATIVE CORONARY ARTERY OF NATIVE HEART WITHOUT ANGINA PECTORIS: Primary | ICD-10-CM

## 2024-01-11 PROCEDURE — 3017F COLORECTAL CA SCREEN DOC REV: CPT | Performed by: INTERNAL MEDICINE

## 2024-01-11 PROCEDURE — G8428 CUR MEDS NOT DOCUMENT: HCPCS | Performed by: INTERNAL MEDICINE

## 2024-01-11 PROCEDURE — G8417 CALC BMI ABV UP PARAM F/U: HCPCS | Performed by: INTERNAL MEDICINE

## 2024-01-11 PROCEDURE — 1123F ACP DISCUSS/DSCN MKR DOCD: CPT | Performed by: INTERNAL MEDICINE

## 2024-01-11 PROCEDURE — G8484 FLU IMMUNIZE NO ADMIN: HCPCS | Performed by: INTERNAL MEDICINE

## 2024-01-11 PROCEDURE — 3077F SYST BP >= 140 MM HG: CPT | Performed by: INTERNAL MEDICINE

## 2024-01-11 PROCEDURE — 99214 OFFICE O/P EST MOD 30 MIN: CPT | Performed by: INTERNAL MEDICINE

## 2024-01-11 PROCEDURE — 1036F TOBACCO NON-USER: CPT | Performed by: INTERNAL MEDICINE

## 2024-01-11 PROCEDURE — 3078F DIAST BP <80 MM HG: CPT | Performed by: INTERNAL MEDICINE

## 2024-01-11 RX ORDER — VITAMIN B COMPLEX
1 CAPSULE ORAL DAILY
COMMUNITY

## 2024-01-11 NOTE — PROGRESS NOTES
[address] [facility], [place] [address]  PHONE: [phone]     24    NAME:  Nii Walters  : 1950  MRN: [id number]       SUBJECTIVE:   Nii Walters is a 73 y.o. male seen for a follow up visit regarding the following:     Chief Complaint   Patient presents with    Coronary Artery Disease       HPI:   Here for CAD.    He has CAD, PCI in  in [place].     NST 2020: low risk, normal.    Echo 2020: normal EF, mild MR   Monitor 2020: sinus evelia, avg HR 60, PVCs     BP lower at home.  No new angina, CP, RENTERIA   HR low, but feeling well.    Patient denies recent history of orthopnea, PND, excessive dizziness and/or syncope.            Still getting chelation in [place].           Had left eye removed in  for melanoma.   Likes preventive measures.       Past Medical History, Past Surgical History, Family history, Social History, and Medications were all reviewed with the patient today and updated as necessary.     Current Outpatient Medications   Medication Sig Dispense Refill    b complex vitamins capsule Take 1 capsule by mouth daily      cyanocobalamin 1000 MCG/ML injection Inject 1 mL into the muscle once a week      Multiple Vitamin (MULTIVITAMIN ADULT PO) Take by mouth      Coenzyme Q10 (COQ10 PO) Take by mouth      Cobalamin Combinations (B12 FOLATE PO) Take by mouth      acetaminophen (TYLENOL) 500 MG tablet Take 1 tablet by mouth every 6 hours as needed      anastrozole (ARIMIDEX) 1 MG tablet Take 0.5 tablets by mouth every 7 days      benzonatate (TESSALON) 200 MG capsule Take 1 capsule by mouth 3 times daily as needed      vitamin D (CHOLECALCIFEROL) 25 MCG (1000 UT) TABS tablet Take 5 tablets by mouth daily      finasteride (PROSCAR) 5 MG tablet Take 1 tablet by mouth daily      levothyroxine (SYNTHROID) 25 MCG tablet Take 1 tablet by mouth every morning (before breakfast)      magnesium oxide (MAG-OX) 400 (240 Mg) MG tablet Take 1 tablet by mouth daily      melatonin 3 MG

## 2024-08-12 ENCOUNTER — OFFICE VISIT (OUTPATIENT)
Age: 74
End: 2024-08-12
Payer: MEDICARE

## 2024-08-12 VITALS
SYSTOLIC BLOOD PRESSURE: 134 MMHG | HEIGHT: 72 IN | BODY MASS INDEX: 27.9 KG/M2 | DIASTOLIC BLOOD PRESSURE: 80 MMHG | WEIGHT: 206 LBS | HEART RATE: 54 BPM

## 2024-08-12 DIAGNOSIS — I10 ESSENTIAL HYPERTENSION: ICD-10-CM

## 2024-08-12 DIAGNOSIS — I25.10 CORONARY ARTERY DISEASE INVOLVING NATIVE CORONARY ARTERY OF NATIVE HEART WITHOUT ANGINA PECTORIS: Primary | ICD-10-CM

## 2024-08-12 DIAGNOSIS — E78.00 PURE HYPERCHOLESTEROLEMIA: ICD-10-CM

## 2024-08-12 DIAGNOSIS — R00.1 BRADYCARDIA: ICD-10-CM

## 2024-08-12 PROCEDURE — G8428 CUR MEDS NOT DOCUMENT: HCPCS | Performed by: INTERNAL MEDICINE

## 2024-08-12 PROCEDURE — 93000 ELECTROCARDIOGRAM COMPLETE: CPT | Performed by: INTERNAL MEDICINE

## 2024-08-12 PROCEDURE — 99214 OFFICE O/P EST MOD 30 MIN: CPT | Performed by: INTERNAL MEDICINE

## 2024-08-12 PROCEDURE — 3079F DIAST BP 80-89 MM HG: CPT | Performed by: INTERNAL MEDICINE

## 2024-08-12 PROCEDURE — G8417 CALC BMI ABV UP PARAM F/U: HCPCS | Performed by: INTERNAL MEDICINE

## 2024-08-12 PROCEDURE — 1036F TOBACCO NON-USER: CPT | Performed by: INTERNAL MEDICINE

## 2024-08-12 PROCEDURE — 3017F COLORECTAL CA SCREEN DOC REV: CPT | Performed by: INTERNAL MEDICINE

## 2024-08-12 PROCEDURE — 3075F SYST BP GE 130 - 139MM HG: CPT | Performed by: INTERNAL MEDICINE

## 2024-08-12 PROCEDURE — 1123F ACP DISCUSS/DSCN MKR DOCD: CPT | Performed by: INTERNAL MEDICINE

## 2024-08-12 NOTE — PROGRESS NOTES
[address] [facility], [place] [address]  PHONE: [phone]     24    NAME:  Nii Wlaters  : 1950  MRN: [id number]       SUBJECTIVE:   Nii Walters is a 73 y.o. male seen for a follow up visit regarding the following:     Chief Complaint   Patient presents with    Coronary Artery Disease    Follow-up       HPI: Here for CAD.    He has CAD, PCI in  in [place].     NST 2020: low risk, normal.    Echo 2020: normal EF, mild MR   Monitor 2020: sinus evelia, avg HR 60, PVCs     BP lower at home.  No new angina, CP, RENTERIA.  Evelia, but asx now.  Yard work and walking, feeling well.  Walking 3-4x per week for 60-90 min, no angina.   HR low, but feeling well.    Patient denies recent history of orthopnea, PND, excessive dizziness and/or syncope.      Labs 2024:  Hb 17, Cr normal, K 4.5, normal LFTs      Still getting chelation in [place]-----every 6 weeks         Had left eye removed in  for melanoma.     Likes preventive measures.          Past Medical History, Past Surgical History, Family history, Social History, and Medications were all reviewed with the patient today and updated as necessary.     Current Outpatient Medications   Medication Sig Dispense Refill    b complex vitamins capsule Take 1 capsule by mouth daily      cyanocobalamin 1000 MCG/ML injection Inject 1 mL into the muscle once a week      Multiple Vitamin (MULTIVITAMIN ADULT PO) Take by mouth      Coenzyme Q10 (COQ10 PO) Take by mouth      Cobalamin Combinations (B12 FOLATE PO) Take by mouth      acetaminophen (TYLENOL) 500 MG tablet Take 1 tablet by mouth every 6 hours as needed      anastrozole (ARIMIDEX) 1 MG tablet Take 0.5 tablets by mouth every 7 days      vitamin D (CHOLECALCIFEROL) 25 MCG (1000 UT) TABS tablet Take 5 tablets by mouth daily      finasteride (PROSCAR) 5 MG tablet Take 1 tablet by mouth daily      levothyroxine (SYNTHROID) 25 MCG tablet Take 1 tablet by mouth every morning (before breakfast)

## 2024-08-30 ENCOUNTER — HOSPITAL ENCOUNTER (EMERGENCY)
Dept: ULTRASOUND IMAGING | Age: 74
End: 2024-08-30
Payer: MEDICARE

## 2024-08-30 ENCOUNTER — HOSPITAL ENCOUNTER (EMERGENCY)
Age: 74
Discharge: HOME | End: 2024-08-30
Attending: EMERGENCY MEDICINE
Payer: MEDICARE

## 2024-08-30 VITALS
OXYGEN SATURATION: 98 % | DIASTOLIC BLOOD PRESSURE: 83 MMHG | RESPIRATION RATE: 18 BRPM | HEART RATE: 60 BPM | SYSTOLIC BLOOD PRESSURE: 140 MMHG | WEIGHT: 205 LBS | HEIGHT: 73 IN | TEMPERATURE: 97.7 F | BODY MASS INDEX: 27.17 KG/M2

## 2024-08-30 DIAGNOSIS — M79.89 LEG SWELLING: Primary | ICD-10-CM

## 2024-08-30 PROCEDURE — 99284 EMERGENCY DEPT VISIT MOD MDM: CPT

## 2024-08-30 PROCEDURE — 93971 EXTREMITY STUDY: CPT | Performed by: RADIOLOGY

## 2024-08-30 PROCEDURE — 93971 EXTREMITY STUDY: CPT

## 2024-08-30 ASSESSMENT — LIFESTYLE VARIABLES
HOW OFTEN DO YOU HAVE A DRINK CONTAINING ALCOHOL: NEVER
HOW MANY STANDARD DRINKS CONTAINING ALCOHOL DO YOU HAVE ON A TYPICAL DAY: PATIENT DOES NOT DRINK

## 2024-08-30 ASSESSMENT — PAIN SCALES - GENERAL: PAINLEVEL_OUTOF10: 3

## 2024-08-30 ASSESSMENT — PAIN DESCRIPTION - ORIENTATION: ORIENTATION: RIGHT

## 2024-08-30 ASSESSMENT — PAIN - FUNCTIONAL ASSESSMENT: PAIN_FUNCTIONAL_ASSESSMENT: 0-10

## 2024-08-30 ASSESSMENT — PAIN DESCRIPTION - LOCATION: LOCATION: ANKLE

## 2024-08-30 NOTE — ED TRIAGE NOTES
Patient ambulatory to triage gait. Pt c/o right ankle pain and swelling. Pt states that he had right ankle ligament sgt in 1973 and has had swelling since then . Patient also state he had right hip replacement surgery in 2020 and has been pronating the right leg/foot since then. Patient states that since the hip surgery he has has echhymosis of the right ankle but now it is worse. Patient is concerned for a blood clot. Patient takes a baby aspirin.

## 2024-09-20 NOTE — ED PROVIDER NOTES
200 MG capsule Take 1 capsule by mouth 3 times daily as neededHistorical Med      vitamin D (CHOLECALCIFEROL) 25 MCG (1000 UT) TABS tablet Take 5 tablets by mouth dailyHistorical Med      finasteride (PROSCAR) 5 MG tablet Take 1 tablet by mouth dailyHistorical Med      levothyroxine (SYNTHROID) 25 MCG tablet Take 1 tablet by mouth every morning (before breakfast)Historical Med      magnesium oxide (MAG-OX) 400 (240 Mg) MG tablet Take 1 tablet by mouth dailyHistorical Med      melatonin 3 MG TABS tablet Take 1 tablet by mouth at bedtimeHistorical Med      Thyroid (NATURE-THROID) 48.75 MG TABS TAKE 1 TABLET BY MOUTH ONCE DAILY IN THE MORNINGHistorical Med              Results for orders placed or performed during the hospital encounter of 08/30/24   Vascular duplex lower extremity venous right    Narrative    TITLE: Lower extremity venous ultrasound examination.    INDICATION: No indication provided. EMR review reveals \"right ankle pain and  swelling\". \"Since the hip surgery he has ecchymoses of the right ankle but now  it's worse.\" Patient is concerned for a blood clot. Patient takes baby aspirin.    TECHNIQUE:  Grayscale, Color Flow, Spectral Analysis, Doppler Interrogation  performed.    COMPARISON: None.    FINDINGS:    Right Lower Extremity:  The Common Femoral, Terminal Great Saphenous, Terminal  Profunda Femoral, Femoral, and Popliteal veins all demonstrate normal  compressibility, spontaneous flow, and augmentation.  The Posterior Tibial and  Peroneal veins demonstrate normal compressibility and spontaneous flow.      Left Lower Extremity:  The Common Femoral is patent.      Impression    No DVT in the right lower extremity.          Electronically signed by AGNIESZKA INTERIANO         Vascular duplex lower extremity venous right   Final Result   No DVT in the right lower extremity.               Electronically signed by AGNIESZKA INTERIANO                   No results for input(s): \"COVID19\" in the last 72

## 2025-04-16 ENCOUNTER — OFFICE VISIT (OUTPATIENT)
Age: 75
End: 2025-04-16
Payer: MEDICARE

## 2025-04-16 VITALS
HEART RATE: 60 BPM | HEIGHT: 73 IN | BODY MASS INDEX: 28.04 KG/M2 | WEIGHT: 211.6 LBS | DIASTOLIC BLOOD PRESSURE: 68 MMHG | SYSTOLIC BLOOD PRESSURE: 130 MMHG

## 2025-04-16 DIAGNOSIS — R00.1 BRADYCARDIA: ICD-10-CM

## 2025-04-16 DIAGNOSIS — I10 ESSENTIAL HYPERTENSION: ICD-10-CM

## 2025-04-16 DIAGNOSIS — I25.10 CORONARY ARTERY DISEASE INVOLVING NATIVE CORONARY ARTERY OF NATIVE HEART WITHOUT ANGINA PECTORIS: Primary | ICD-10-CM

## 2025-04-16 DIAGNOSIS — E78.00 PURE HYPERCHOLESTEROLEMIA: ICD-10-CM

## 2025-04-16 PROCEDURE — 1123F ACP DISCUSS/DSCN MKR DOCD: CPT | Performed by: INTERNAL MEDICINE

## 2025-04-16 PROCEDURE — 99214 OFFICE O/P EST MOD 30 MIN: CPT | Performed by: INTERNAL MEDICINE

## 2025-04-16 PROCEDURE — 3078F DIAST BP <80 MM HG: CPT | Performed by: INTERNAL MEDICINE

## 2025-04-16 PROCEDURE — 3075F SYST BP GE 130 - 139MM HG: CPT | Performed by: INTERNAL MEDICINE

## 2025-04-16 NOTE — PROGRESS NOTES
[address] [facility], [place] [address]  PHONE: [phone]     25    NAME:  Nii Walters  : 1950  MRN: [id number]       SUBJECTIVE:   Nii Walters is a 74 y.o. male seen for a follow up visit regarding the following:     Chief Complaint   Patient presents with    Coronary Artery Disease    Follow-up       HPI: Here for CAD.    He has CAD, PCI in  in [place].     NST 2020: low risk, normal.    Echo 2020: normal EF, mild MR   Monitor 2020: sinus evelia, avg HR 60, PVCs      No new angina, CP, RENTERIA.  Evelia, but asx now.  Yard work and walking, feeling well.    Slowed some since hip replacement.  Walking 3-4x per week for 60-90 min, no angina.    Patient denies recent history of orthopnea, PND, excessive dizziness and/or syncope.      Labs 2024:  Hb 17, Cr normal, K 4.5, normal LFTs      Still getting chelation in [place]-----every 6 weeks         Had left eye removed in  for melanoma.      Likes preventive measures.         Past Medical History, Past Surgical History, Family history, Social History, and Medications were all reviewed with the patient today and updated as necessary.     Current Outpatient Medications   Medication Sig Dispense Refill    b complex vitamins capsule Take 1 capsule by mouth daily      cyanocobalamin 1000 MCG/ML injection Inject 1 mL into the muscle once a week      Multiple Vitamin (MULTIVITAMIN ADULT PO) Take by mouth      Coenzyme Q10 (COQ10 PO) Take by mouth      Cobalamin Combinations (B12 FOLATE PO) Take by mouth      acetaminophen (TYLENOL) 500 MG tablet Take 1 tablet by mouth every 6 hours as needed      anastrozole (ARIMIDEX) 1 MG tablet Take 0.5 tablets by mouth Every 12 days      vitamin D (CHOLECALCIFEROL) 25 MCG (1000 UT) TABS tablet Take 5 tablets by mouth daily      finasteride (PROSCAR) 5 MG tablet Take 1 tablet by mouth daily      levothyroxine (SYNTHROID) 25 MCG tablet Take 1 tablet by mouth every morning (before breakfast)

## (undated) DEVICE — DRAPE TWL SURG 16X26IN BLU ORB04] ALLCARE INC]

## (undated) DEVICE — SUTURE VCRL SZ 2-0 L27IN ABSRB UD L36MM CP-1 1/2 CIR REV J266H

## (undated) DEVICE — BIPOLAR SEALER 23-112-1 AQM 6.0: Brand: AQUAMANTYS ®

## (undated) DEVICE — SYR LR LCK 1ML GRAD NSAF 30ML --

## (undated) DEVICE — (D)PREP SKN CHLRAPRP APPL 26ML -- CONVERT TO ITEM 371833

## (undated) DEVICE — 3M™ IOBAN™ 2 ANTIMICROBIAL INCISE DRAPE 6651EZ: Brand: IOBAN™ 2

## (undated) DEVICE — STOCKINETTE TUBE 6X48 -- MEDICHOICE

## (undated) DEVICE — TIP SUCTION ORAL YANKAUER TIP --

## (undated) DEVICE — SUTURE ABSRB X-1 REV CUT 1/2 CIR 22MM UD BRAID 27IN SZ 3-0 J458H

## (undated) DEVICE — HOOD: Brand: FLYTE

## (undated) DEVICE — Z DISCONTINUED USE 2744636  DRESSING AQUACEL 14 IN ALG W3.5XL14IN POLYUR FLM CVR W/ HYDRCOLL

## (undated) DEVICE — 3M™ STERI-DRAPE™ INCISE DRAPE, XL 1051: Brand: STERI-DRAPE™

## (undated) DEVICE — HANDPIECE SET WITH COAXIAL HIGH FLOW TIP AND SUCTION TUBE: Brand: INTERPULSE

## (undated) DEVICE — STAPLER SKIN H3.9MM WIRE DIA0.58MM CRWN 6.9MM 35 STPL FIX

## (undated) DEVICE — TOTAL HIP DR JENNINGS: Brand: MEDLINE INDUSTRIES, INC.

## (undated) DEVICE — STAPLER SKIN SQ 30 ABSRB STPL DISP INSORB

## (undated) DEVICE — SOLUTION IV 1000ML 0.9% SOD CHL

## (undated) DEVICE — SOLUTION IRRIG 3000ML 0.9% SOD CHL FLX CONT 0797208] ICU MEDICAL INC]

## (undated) DEVICE — 3M™ STERI-DRAPE™ INSTRUMENT POUCH 1018: Brand: STERI-DRAPE™

## (undated) DEVICE — T4 HOOD

## (undated) DEVICE — STOCKINETTE,IMPERVIOUS,12X48,STERILE: Brand: MEDLINE

## (undated) DEVICE — SYR 50ML LR LCK 1ML GRAD NSAF --

## (undated) DEVICE — REM POLYHESIVE ADULT PATIENT RETURN ELECTRODE: Brand: VALLEYLAB

## (undated) DEVICE — TRAY PREP DRY W/ PREM GLV 2 APPL 6 SPNG 2 UNDPD 1 OVERWRAP

## (undated) DEVICE — DUAL CUT SAGITTAL BLADE

## (undated) DEVICE — SYR 10ML LUER LOK 1/5ML GRAD --

## (undated) DEVICE — SOLUTION IV 250ML 0.9% SOD CHL CLR INJ FLX BG CONT PRT CLSR

## (undated) DEVICE — Z DISCONTINUED PER MEDLINE USE 2741944 DRESSING AQUACEL 12 IN SURG W9XL30CM SIL CVR WTRPRF VIR BACT BARR ANTIMIC